# Patient Record
Sex: MALE | Race: WHITE | NOT HISPANIC OR LATINO | URBAN - METROPOLITAN AREA
[De-identification: names, ages, dates, MRNs, and addresses within clinical notes are randomized per-mention and may not be internally consistent; named-entity substitution may affect disease eponyms.]

---

## 2017-03-13 PROBLEM — J84.9 INTERSTITIAL LUNG DISEASE (HCC): Status: ACTIVE | Noted: 2017-03-13

## 2017-03-13 PROBLEM — G47.33 OSA (OBSTRUCTIVE SLEEP APNEA): Status: ACTIVE | Noted: 2017-03-13

## 2017-03-13 PROBLEM — G62.9 PERIPHERAL NEUROPATHY: Status: ACTIVE | Noted: 2017-03-13

## 2017-03-13 PROBLEM — N28.9 RENAL INSUFFICIENCY: Status: ACTIVE | Noted: 2017-03-13

## 2017-03-13 PROBLEM — L30.9 ECZEMA: Status: ACTIVE | Noted: 2017-03-13

## 2017-03-13 PROBLEM — J45.909 ASTHMA: Status: ACTIVE | Noted: 2017-03-13

## 2017-03-13 PROBLEM — N05.9 BRIGHT'S DISEASE: Status: ACTIVE | Noted: 2017-03-13

## 2017-03-13 PROBLEM — G47.34 NOCTURNAL HYPOXEMIA: Status: ACTIVE | Noted: 2017-03-13

## 2017-03-13 PROBLEM — J30.9 ALLERGIC RHINITIS: Status: ACTIVE | Noted: 2017-03-13

## 2017-03-13 PROBLEM — C44.91 BASAL CELL CARCINOMA: Status: ACTIVE | Noted: 2017-03-13

## 2017-03-13 PROBLEM — E78.5 HYPERLIPIDEMIA: Status: ACTIVE | Noted: 2017-03-13

## 2017-03-13 PROBLEM — K21.9 GERD (GASTROESOPHAGEAL REFLUX DISEASE): Status: ACTIVE | Noted: 2017-03-13

## 2017-03-13 PROBLEM — K58.9 IBS (IRRITABLE BOWEL SYNDROME): Status: ACTIVE | Noted: 2017-03-13

## 2017-05-10 RX ORDER — GABAPENTIN 300 MG/1
CAPSULE ORAL
Qty: 180 CAPSULE | Refills: 2 | Status: SHIPPED | OUTPATIENT
Start: 2017-05-10 | End: 2018-11-29 | Stop reason: SDUPTHER

## 2018-08-06 RX ORDER — GABAPENTIN 300 MG/1
CAPSULE ORAL
Qty: 180 CAPSULE | Refills: 0 | OUTPATIENT
Start: 2018-08-06

## 2018-10-23 ENCOUNTER — OFFICE VISIT (OUTPATIENT)
Dept: NEUROLOGY | Facility: CLINIC | Age: 63
End: 2018-10-23

## 2018-10-23 VITALS
WEIGHT: 264.55 LBS | DIASTOLIC BLOOD PRESSURE: 84 MMHG | BODY MASS INDEX: 30.61 KG/M2 | SYSTOLIC BLOOD PRESSURE: 124 MMHG | HEIGHT: 78 IN

## 2018-10-23 DIAGNOSIS — G62.9 PERIPHERAL POLYNEUROPATHY: Primary | ICD-10-CM

## 2018-10-23 DIAGNOSIS — G57.12 MERALGIA PARESTHETICA OF LEFT SIDE: ICD-10-CM

## 2018-10-23 PROCEDURE — 99214 OFFICE O/P EST MOD 30 MIN: CPT | Performed by: PHYSICIAN ASSISTANT

## 2018-10-23 RX ORDER — LORATADINE 10 MG/1
TABLET ORAL DAILY
COMMUNITY
Start: 2016-03-24

## 2018-10-23 RX ORDER — OMEPRAZOLE 20 MG/1
20 CAPSULE, DELAYED RELEASE ORAL DAILY
COMMUNITY
End: 2022-12-24

## 2018-10-23 RX ORDER — BUDESONIDE AND FORMOTEROL FUMARATE DIHYDRATE 80; 4.5 UG/1; UG/1
AEROSOL RESPIRATORY (INHALATION)
COMMUNITY
Start: 2014-04-21

## 2018-10-23 NOTE — PROGRESS NOTES
Subjective     Chief Complaint: numbness      History of Present Illness   Pt originally seen 2/24/15 at the request of Dr. Maude Lim for evaluation of neuropathy that he reports began years ago but is worsening. Figured out dx himself by comparison to mother's sx. Noted 6-7 years ago that he could not feel pin on toe. Also describes painful numbness intermittently lateral thigh maybe triggered by keys rubbing in pocket. That has not occurred last two years, until using GBP 300mg qhs past month, and it has just recently recurred -- extremely painful and keeps him awake at night.     Right more than left foot numbness more than burning pain, and recently felt like hole in bottom of sock where there wasn't. Usually felt more in toes. Has progressed. Strength is good. Does get sharp pain in left shin, in muscle, cramping at times.   Dtr who is 20 (and has Eve Danlos) already has tingling.    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 Recent serum immunofixation, B12, TSH, sedimentation rate and A1c were normal, the latter 5.7.    Mother has neuropathy, and grandmother had similar pain.   Father has flat feet, and pt has had flat feet whole life.  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 Most other common (exacerbating) causes ruled out with labs to date. Discussed potential for genetic testing (declines), and recommended NCS and OGTT, but he is not sure he wants to pursue these. He wants to think this over.   For now, will restart and titrate GBP and discussed potential SEs, alternatives. Discussed weight loss for the meralgia paresthetica.  5/15: GBP helps a lot for the pain. Takes 2 at bedtime only, because the worst pain is with lying in bed at night. GBP also helps sleep. No bad thigh pain since on the GBP. Was worrying about taking more -- very concerned about taking excessive medication and would prefer no medicine at all. Wonders if he could skip the  "gabapentin some nights and then take something for sleep alone. No weakness.     Today: Since his last visit in 5/15, he notes that his symptoms are essentially unchanged. He currently taking GBP 300mg or 600mg nightly, which has been beneficial.         I have reviewed and confirmed the past family, social and medical history as accurate on 10/23/18.     Review of Systems   Constitutional: Negative.    HENT: Negative.    Eyes: Negative.    Respiratory: Negative.    Cardiovascular: Negative.    Gastrointestinal: Negative.    Endocrine: Negative.    Genitourinary: Negative.    Musculoskeletal: Negative.    Skin: Negative.    Allergic/Immunologic: Negative.    Neurological:        Numbness    Hematological: Negative.    Psychiatric/Behavioral: Negative.        Objective     /84   Ht 198.1 cm (78\")   Wt 120 kg (264 lb 8.8 oz)   BMI 30.57 kg/m²     General appearance today is normal.       Physical Exam   Neurological: He has normal strength. He has a normal Finger-Nose-Finger Test. Gait normal.   Psychiatric: His speech is normal.        Neurologic Exam     Mental Status   Speech: speech is normal   Level of consciousness: alert  Normal comprehension.     Cranial Nerves   Cranial nerves II through XII intact.     Motor Exam   Muscle bulk: normal  Overall muscle tone: normal    Strength   Strength 5/5 throughout.     Sensory Exam   Decreased sensation in lower extremities, worse on the right      Gait, Coordination, and Reflexes     Gait  Gait: normal    Coordination   Finger to nose coordination: normal    Tremor   Resting tremor: absent        Assessment/Plan   Khang was seen today for peripheral neuropathy.    Diagnoses and all orders for this visit:    Peripheral polyneuropathy    Meralgia paresthetica of left side          Discussion/Summary   Khang Abebe returns to clinic today with a history concerning for a hereditary peripheral neuropathy as well as Meralgia paresthetica. We discussed potentially " obtaining genetic testing and/or an EMG, thought this was reasonably declined as he feels that he is doing well overall. After discussing potential treatment options, it was elected to continue on GBP unchanged. He will then follow up in 1 year, or sooner if needed.   I spent 25 minutes minutes face to face with the patient and family with 15 minutes spent on discussing diagnosis, prognosis, evaluation, current status, treatment options and management as discussed above.       As part of this visit I discussed the history with the patient .      Shawna Wang PA-C

## 2018-11-20 NOTE — TELEPHONE ENCOUNTER
Khang called requesting a refill on his GBP. States in Shawna Wang's note continue unchanged.     ,  Can you please approve this refill?

## 2018-11-29 RX ORDER — GABAPENTIN 300 MG/1
300 CAPSULE ORAL
Qty: 180 CAPSULE | Refills: 2 | Status: SHIPPED | OUTPATIENT
Start: 2018-11-29 | End: 2019-02-28 | Stop reason: SDUPTHER

## 2019-02-28 RX ORDER — GABAPENTIN 300 MG/1
CAPSULE ORAL
Qty: 180 CAPSULE | Refills: 1 | Status: SHIPPED | OUTPATIENT
Start: 2019-02-28 | End: 2019-04-27 | Stop reason: SDUPTHER

## 2019-04-29 RX ORDER — GABAPENTIN 300 MG/1
CAPSULE ORAL
Qty: 180 CAPSULE | Refills: 0 | Status: SHIPPED | OUTPATIENT
Start: 2019-04-29 | End: 2019-05-24 | Stop reason: SDUPTHER

## 2019-05-24 RX ORDER — GABAPENTIN 300 MG/1
CAPSULE ORAL
Qty: 180 CAPSULE | Refills: 0 | Status: SHIPPED | OUTPATIENT
Start: 2019-05-24 | End: 2019-06-22 | Stop reason: SDUPTHER

## 2019-06-24 RX ORDER — GABAPENTIN 300 MG/1
CAPSULE ORAL
Qty: 180 CAPSULE | Refills: 0 | Status: SHIPPED | OUTPATIENT
Start: 2019-06-24 | End: 2019-07-23 | Stop reason: SDUPTHER

## 2019-06-26 RX ORDER — GABAPENTIN 300 MG/1
CAPSULE ORAL
Qty: 180 CAPSULE | Refills: 0 | OUTPATIENT
Start: 2019-06-26

## 2019-07-24 RX ORDER — GABAPENTIN 300 MG/1
CAPSULE ORAL
Qty: 180 CAPSULE | Refills: 0 | Status: SHIPPED | OUTPATIENT
Start: 2019-07-24 | End: 2019-08-26 | Stop reason: SDUPTHER

## 2019-08-26 RX ORDER — GABAPENTIN 300 MG/1
CAPSULE ORAL
Qty: 180 CAPSULE | Refills: 0 | Status: SHIPPED | OUTPATIENT
Start: 2019-08-26 | End: 2019-09-23 | Stop reason: SDUPTHER

## 2019-09-25 RX ORDER — GABAPENTIN 300 MG/1
CAPSULE ORAL
Qty: 180 CAPSULE | Refills: 0 | Status: SHIPPED | OUTPATIENT
Start: 2019-09-25 | End: 2020-01-30

## 2019-09-25 NOTE — TELEPHONE ENCOUNTER
That is correct. Khang saw Shawna on 10/23/18, but his follow up appt is coming up next month and has been scheduled with  so this will need to be changed! Attempted to contact patient in order to get him rescheduled with [facility] Romo instead. Office # given.    ,  In the meantime would you be willing to approve a 1 month supply and I will add on instructions:   PLEASE CALL TO RESCHEDULE FOLLOW UP APPT.

## 2019-09-27 ENCOUNTER — TELEPHONE (OUTPATIENT)
Dept: NEUROLOGY | Facility: CLINIC | Age: 64
End: 2019-09-27
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
## 2019-09-27 NOTE — TELEPHONE ENCOUNTER
I'm sorry to hear about this. We can refill through October, but not beyond. However, the providers in the hospital can likely provide refills until she can come in for follow-up. Or her PCP may be able to take over. Thanks.

## 2019-09-27 NOTE — TELEPHONE ENCOUNTER
Informed the patient and wife to contact the physicans in the hospital and his PCP. Since pt has not been seen in almost a year explained that we would need a recent appt.

## 2019-10-22 ENCOUNTER — OFFICE VISIT (OUTPATIENT)
Dept: NEUROLOGY | Facility: CLINIC | Age: 64
End: 2019-10-22

## 2019-10-22 VITALS — HEIGHT: 78 IN | WEIGHT: 264 LBS | BODY MASS INDEX: 30.55 KG/M2

## 2019-10-22 DIAGNOSIS — G57.12 MERALGIA PARESTHETICA OF LEFT SIDE: ICD-10-CM

## 2019-10-22 DIAGNOSIS — G62.9 PERIPHERAL POLYNEUROPATHY: Primary | ICD-10-CM

## 2019-10-22 PROCEDURE — 99214 OFFICE O/P EST MOD 30 MIN: CPT | Performed by: PHYSICIAN ASSISTANT

## 2019-10-22 RX ORDER — MULTIPLE VITAMINS W/ MINERALS TAB 9MG-400MCG
1 TAB ORAL DAILY
COMMUNITY

## 2019-10-22 RX ORDER — ERGOCALCIFEROL (VITAMIN D2) 10 MCG
400 TABLET ORAL DAILY
COMMUNITY
End: 2021-10-29

## 2019-10-22 NOTE — PROGRESS NOTES
Subjective     Chief Complaint: numbness      History of Present Illness   Pt originally seen 2/24/15 at the request of Dr. Maude Lim for evaluation of neuropathy that he reports began years ago but is worsening. Figured out dx himself by comparison to mother's sx. Noted 6-7 years ago that he could not feel pin on toe. Also describes painful numbness intermittently lateral thigh maybe triggered by keys rubbing in pocket. That has not occurred last two years, until using GBP 300mg qhs past month, and it has just recently recurred -- extremely painful and keeps him awake at night.      Right more than left foot numbness more than burning pain, and recently felt like hole in bottom of sock where there wasn't. Usually felt more in toes. Has progressed. Strength is good. Does get sharp pain in left shin, in muscle, cramping at times.   Dtr who is 20 (and has Eve Danlos) already has tingling.     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 Recent serum immunofixation, B12, TSH, sedimentation rate and A1c were normal, the latter 5.7.     Mother has neuropathy, and grandmother had similar pain.   Father has flat feet, and pt has had flat feet whole life.  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 Most other common (exacerbating) causes ruled out with labs to date. Discussed potential for genetic testing (declines), and recommended NCS and OGTT, but he is not sure he wants to pursue these. He wants to think this over.   For now, will restart and titrate GBP and discussed potential SEs, alternatives. Discussed weight loss for the meralgia paresthetica.  5/15: GBP helps a lot for the pain. Takes 2 at bedtime only, because the worst pain is with lying in bed at night. GBP also helps sleep. No bad thigh pain since on the GBP. Was worrying about taking more -- very concerned about taking excessive medication and would prefer no medicine at all. Wonders if he could skip the  "gabapentin some nights and then take something for sleep alone. No weakness.     Today: Since his last visit in 10/18, he notes that his symptoms have worsened after he sustained a fall in 9/19. Unfortunately, he required surgery for a left femur fracture. He also fractured his right fibula. He is taking GBP 300mg BID, which has been beneficial.       I have reviewed and confirmed the past family, social and medical history as accurate on 10/22/19.     Review of Systems   Constitutional: Negative.    HENT: Negative.    Eyes: Negative.    Respiratory: Negative.    Cardiovascular: Negative.    Gastrointestinal: Negative.    Endocrine: Negative.    Genitourinary: Negative.    Musculoskeletal: Negative.    Skin: Negative.    Allergic/Immunologic: Negative.    Hematological: Negative.    Psychiatric/Behavioral: Negative.        Objective     Ht 198.1 cm (78\")   Wt 120 kg (264 lb)   BMI 30.51 kg/m²     General appearance today is normal.       Physical Exam   Neurological: He has normal strength. He has a normal Finger-Nose-Finger Test.   Psychiatric: His speech is normal.        Neurologic Exam     Mental Status   Speech: speech is normal   Level of consciousness: alert  Normal comprehension.     Cranial Nerves   Cranial nerves II through XII intact.     Motor Exam   Muscle bulk: normal  Overall muscle tone: normal    Strength   Strength 5/5 throughout.     Sensory Exam   Decreased sensation in lower extremities, worse on the right       Gait, Coordination, and Reflexes     Coordination   Finger to nose coordination: normal    Tremor   Resting tremor: absent            Assessment/Plan   Khang was seen today for peripheral neuropathy.    Diagnoses and all orders for this visit:    Peripheral polyneuropathy    Meralgia paresthetica of left side          Discussion/Summary   Khang Abebe returns to clinic today with a history concerning for a hereditary peripheral neuropathy as well as Meralgia paresthetica. We discussed " potentially obtaining genetic testing and/or an EMG, thought this was reasonably declined as he feels that he is doing well overall. After discussing potential treatment options, it was elected to continue on GBP unchanged. He will then follow up in 1 year, or sooner if needed.   I spent 25 minutes face to face with the patient and family with 15 minutes spent on discussing diagnosis, prognosis, diagnostic testing, evaluation, current status, treatment options and management as discussed above.       As part of this visit I obtained additional history from the family which is incorporated in the HPI.      Shawna Wang PA-C

## 2020-01-30 DIAGNOSIS — G62.9 PERIPHERAL POLYNEUROPATHY: Primary | ICD-10-CM

## 2020-01-30 RX ORDER — GABAPENTIN 300 MG/1
CAPSULE ORAL
Qty: 180 CAPSULE | Refills: 0 | Status: SHIPPED | OUTPATIENT
Start: 2020-01-30 | End: 2020-03-24

## 2020-03-24 DIAGNOSIS — G62.9 PERIPHERAL POLYNEUROPATHY: ICD-10-CM

## 2020-03-24 RX ORDER — GABAPENTIN 300 MG/1
CAPSULE ORAL
Qty: 180 CAPSULE | Refills: 0 | Status: SHIPPED | OUTPATIENT
Start: 2020-03-24 | End: 2020-04-06

## 2020-04-03 DIAGNOSIS — G62.9 PERIPHERAL POLYNEUROPATHY: ICD-10-CM

## 2020-04-06 RX ORDER — GABAPENTIN 300 MG/1
CAPSULE ORAL
Qty: 180 CAPSULE | Refills: 0 | Status: SHIPPED | OUTPATIENT
Start: 2020-04-06 | End: 2020-05-18

## 2020-05-16 DIAGNOSIS — G62.9 PERIPHERAL POLYNEUROPATHY: ICD-10-CM

## 2020-05-18 RX ORDER — GABAPENTIN 300 MG/1
CAPSULE ORAL
Qty: 180 CAPSULE | Refills: 0 | Status: SHIPPED | OUTPATIENT
Start: 2020-05-18 | End: 2020-07-02

## 2020-07-01 DIAGNOSIS — G62.9 PERIPHERAL POLYNEUROPATHY: ICD-10-CM

## 2020-07-02 RX ORDER — GABAPENTIN 300 MG/1
CAPSULE ORAL
Qty: 180 CAPSULE | Refills: 0 | Status: SHIPPED | OUTPATIENT
Start: 2020-07-02 | End: 2020-08-04

## 2020-07-29 ENCOUNTER — TELEPHONE (OUTPATIENT)
Dept: FAMILY MEDICINE CLINIC | Facility: CLINIC | Age: 65
End: 2020-07-29

## 2020-08-04 ENCOUNTER — TELEPHONE (OUTPATIENT)
Dept: FAMILY MEDICINE CLINIC | Facility: CLINIC | Age: 65
End: 2020-08-04

## 2020-08-04 DIAGNOSIS — G62.9 PERIPHERAL POLYNEUROPATHY: ICD-10-CM

## 2020-08-04 RX ORDER — GABAPENTIN 300 MG/1
CAPSULE ORAL
Qty: 180 CAPSULE | Refills: 0 | Status: SHIPPED | OUTPATIENT
Start: 2020-08-04 | End: 2020-09-14

## 2020-08-04 NOTE — TELEPHONE ENCOUNTER
Pt called because he would like to know if Dr. Maude Lim is opposed to a pt taking  Hydroxychloroquine. He wants to know Dr's personal opinion. Pt is no longer a pt but, He stated that the answer will determine wether he will return as a patient. Current pcp doesn't prescribe Hydroxychloroquine. He states that he is being proactive in case he so happens to catch Covid he wants a dr that that will prescribe that medication     Please call pt to advise     [phone]

## 2020-09-14 DIAGNOSIS — G62.9 PERIPHERAL POLYNEUROPATHY: ICD-10-CM

## 2020-09-14 RX ORDER — GABAPENTIN 300 MG/1
CAPSULE ORAL
Qty: 180 CAPSULE | Refills: 0 | Status: SHIPPED | OUTPATIENT
Start: 2020-09-14 | End: 2020-10-19

## 2020-10-16 DIAGNOSIS — G62.9 PERIPHERAL POLYNEUROPATHY: ICD-10-CM

## 2020-10-19 RX ORDER — GABAPENTIN 300 MG/1
CAPSULE ORAL
Qty: 180 CAPSULE | Refills: 0 | Status: SHIPPED | OUTPATIENT
Start: 2020-10-19 | End: 2020-11-16

## 2020-10-22 ENCOUNTER — OFFICE VISIT (OUTPATIENT)
Dept: NEUROLOGY | Facility: CLINIC | Age: 65
End: 2020-10-22

## 2020-10-22 ENCOUNTER — LAB (OUTPATIENT)
Dept: LAB | Facility: HOSPITAL | Age: 65
End: 2020-10-22

## 2020-10-22 DIAGNOSIS — G62.9 PERIPHERAL POLYNEUROPATHY: Primary | ICD-10-CM

## 2020-10-22 PROCEDURE — 84207 ASSAY OF VITAMIN B-6: CPT | Performed by: PHYSICIAN ASSISTANT

## 2020-10-22 PROCEDURE — 99214 OFFICE O/P EST MOD 30 MIN: CPT | Performed by: PHYSICIAN ASSISTANT

## 2020-10-22 PROCEDURE — 36415 COLL VENOUS BLD VENIPUNCTURE: CPT | Performed by: PHYSICIAN ASSISTANT

## 2020-10-22 NOTE — PROGRESS NOTES
Subjective       Chief Complaint: neuropathy      History of Present Illness   Pt originally seen 2/24/15 at the request of Dr. Maude Lim for evaluation of neuropathy that he reports began years ago but is worsening. Figured out dx himself by comparison to mother's sx. Noted 6-7 years ago that he could not feel pin on toe. Also describes painful numbness intermittently lateral thigh maybe triggered by keys rubbing in pocket. That has not occurred last two years, until using GBP 300mg qhs past month, and it has just recently recurred -- extremely painful and keeps him awake at night.      Right more than left foot numbness more than burning pain, and recently felt like hole in bottom of sock where there wasn't. Usually felt more in toes. Has progressed. Strength is good. Does get sharp pain in left shin, in muscle, cramping at times.   Dtr who is 20 (and has Eve Danlos) already has tingling.     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 Recent serum immunofixation, B12, TSH, sedimentation rate and A1c were normal, the latter 5.7.     Mother has neuropathy, and grandmother had similar pain.   Father has flat feet, and pt has had flat feet whole life.  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 Most other common (exacerbating) causes ruled out with labs to date. Discussed potential for genetic testing (declines), and recommended NCS and OGTT, but he is not sure he wants to pursue these. He wants to think this over.     Today: Since his last visit in 10/19, he notes that his symptoms have worsened after he sustained a fall in 9/19, requiring surgery for a left femur fracture. This has gradually improved over the last year. He is currently taking GBP 1200mg daily, in divided doses.       I have reviewed and confirmed the past family, social and medical history as accurate on 10/22/2020.     Review of Systems   Constitutional: Negative.    HENT: Negative.    Eyes:  Negative.    Respiratory: Negative.    Cardiovascular: Negative.    Gastrointestinal: Negative.    Endocrine: Negative.    Genitourinary: Negative.    Musculoskeletal: Negative.    Skin: Negative.    Allergic/Immunologic: Negative.    Neurological: Positive for numbness.   Hematological: Negative.    Psychiatric/Behavioral: Negative.        Objective     General appearance today is normal.       Physical Exam  Neurological:      Coordination: Finger-Nose-Finger Test and Heel to Shin Test normal.      Gait: Gait is intact.      Deep Tendon Reflexes: Strength normal.   Psychiatric:         Speech: Speech normal.          Neurologic Exam     Mental Status   Speech: speech is normal   Level of consciousness: alert  Normal comprehension.     Cranial Nerves   Cranial nerves II through XII intact.     Motor Exam   Muscle bulk: normal  Overall muscle tone: normal    Strength   Strength 5/5 throughout.     Gait, Coordination, and Reflexes     Gait  Gait: normal    Coordination   Finger to nose coordination: normal  Heel to shin coordination: normal    Tremor   Resting tremor: absent        Assessment/Plan   Diagnoses and all orders for this visit:    1. Peripheral polyneuropathy (Primary)  -     Vitamin B6          Discussion/Summary   Khang Abebe returns to clinic today with a history of peripheral neuropathy. I again reviewed his current status and treatment options. It was elected to obtain screening blood work . After discussing potential treatment options, it was elected to continue on GBP unchanged for now as this has been beneficial. He will then follow up in 6 months , or sooner if needed.   I spent 25 minutes face to face with the patient with 15 minutes spent on discussing diagnosis, diagnostic testing, evaluation, current status, treatment options and management as discussed above.       As part of this visit I discussed the history with the patient .      Shawna Wang PA-C

## 2020-10-30 ENCOUNTER — TELEPHONE (OUTPATIENT)
Dept: NEUROLOGY | Facility: CLINIC | Age: 65
End: 2020-10-30

## 2020-11-04 LAB — VIT B6 SERPL-MCNC: 7.7 UG/L (ref 5.3–46.7)

## 2020-11-16 DIAGNOSIS — G62.9 PERIPHERAL POLYNEUROPATHY: ICD-10-CM

## 2020-11-16 RX ORDER — GABAPENTIN 300 MG/1
CAPSULE ORAL
Qty: 180 CAPSULE | Refills: 0 | Status: SHIPPED | OUTPATIENT
Start: 2020-11-16 | End: 2020-12-31

## 2020-12-30 DIAGNOSIS — G62.9 PERIPHERAL POLYNEUROPATHY: ICD-10-CM

## 2020-12-31 RX ORDER — GABAPENTIN 300 MG/1
CAPSULE ORAL
Qty: 180 CAPSULE | Refills: 4 | Status: SHIPPED | OUTPATIENT
Start: 2020-12-31 | End: 2021-06-07

## 2021-04-29 ENCOUNTER — OFFICE VISIT (OUTPATIENT)
Dept: NEUROLOGY | Facility: CLINIC | Age: 66
End: 2021-04-29

## 2021-04-29 VITALS
WEIGHT: 281 LBS | OXYGEN SATURATION: 98 % | TEMPERATURE: 96.9 F | DIASTOLIC BLOOD PRESSURE: 82 MMHG | BODY MASS INDEX: 32.51 KG/M2 | HEART RATE: 52 BPM | HEIGHT: 78 IN | SYSTOLIC BLOOD PRESSURE: 126 MMHG

## 2021-04-29 DIAGNOSIS — G62.9 PERIPHERAL POLYNEUROPATHY: Primary | ICD-10-CM

## 2021-04-29 PROCEDURE — 99214 OFFICE O/P EST MOD 30 MIN: CPT | Performed by: NURSE PRACTITIONER

## 2021-04-29 RX ORDER — TIZANIDINE 4 MG/1
4 TABLET ORAL NIGHTLY PRN
COMMUNITY
End: 2021-10-29

## 2021-04-29 NOTE — PROGRESS NOTES
Subjective   Patient ID: Khang Abebe is a 65 y.o. male     Chief Complaint   Patient presents with   • polyneuropathy        History of Present Illness  65 y.o. Male presents in follow up for neuropathy. At last appointment on 10/22/20 continued  mg 6 times daily.     Trelstar was started in January for his prostate cancer and developed generalized body pain, fatigue, dizziness, diarrhea, and flushing. Feels his pain has worsened from the medication. Has read that he has the majority of his symptoms are related to the Trelstar.    Has been taking 600 mg PO QHS of GBP most of the time. But recently feels his pain is worse at night.     Problem History:    10 year hx of painful numbness and worsening over time.     R > L foot numbness more than burning pain, feels like there is a hole in the bottom of the sock but there isn't one. Sharp pain in L shin, cramping. Daugther has Eve Danlos. HX of bright disease. Labs NCS.     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 Most other common (exacerbating) causes ruled out with labs to date. Discussed potential for genetic testing (declines), and recommended NCS and OGTT, but he is not sure he wants to pursue these. He wants to think this over.      Today: Since his last visit in 10/19, he notes that his symptoms have worsened after he sustained a fall in 9/19, requiring surgery for a left femur fracture. This has gradually improved over the last year. He is currently taking GBP 1200mg daily, in divided doses.     Past Medical History:   Diagnosis Date   • Actinic keratosis    • Asthma    • Bronchitis    • CAP (community acquired pneumonia)    • Cardiac abnormality    • Fatigue    • H/O chest x-ray 02/17/2016    Interstitial and granulomatous scarring is seen centrally and peripherallly in the chest. Superimposed active disease or consolidation is not identified   • H/O chest x-ray 07/12/2014    No acute cardiopulmonary process   • H/O  "echocardiogram 10/31/2014    Normal LVSF. Est EF 60-65%. Mild mitral valve prolpase. Trace MR   • History of PFTs 03/24/2016    PFT acceptable and reproducible. Pt gave best effort. Normal PFT. Normal lung volumes and normal diffusion   • Low testosterone    • Meralgia paresthetica    • Neuropathy    • Sinusitis    • Sleep apnea    • Strep pharyngitis      Family History   Problem Relation Age of Onset   • Alcohol abuse Father    • Liver cancer Father    • Lung cancer Father    • Heart disease Other         coronary arteriosclerosis   • Other Other         PVD     Social History     Socioeconomic History   • Marital status:      Spouse name: Not on file   • Number of children: Not on file   • Years of education: Not on file   • Highest education level: Not on file   Tobacco Use   • Smoking status: Never Smoker   Substance and Sexual Activity   • Alcohol use: No   • Drug use: Defer   • Sexual activity: Defer       Review of Systems    Objective     Vitals:    04/29/21 1053   BP: 126/82   Pulse: 52   Temp: 96.9 °F (36.1 °C)   SpO2: 98%   Weight: 127 kg (281 lb)   Height: 198.1 cm (78\")       Neurologic Exam     Mental Status   Oriented to person, place, and time.   Follows 3 step commands.   Attention: normal. Concentration: normal.   Speech: speech is normal   Level of consciousness: alert  Knowledge: good and consistent with education.   Able to name object. Able to read. Able to repeat. Able to write. Normal comprehension.     Cranial Nerves     CN II   Visual fields full to confrontation.   Visual acuity: normal  Right visual field deficit: none  Left visual field deficit: none     CN III, IV, VI   Pupils are equal, round, and reactive to light.  Extraocular motions are normal.   Right pupil: Shape: regular. Reactivity: brisk. Consensual response: intact.   Left pupil: Shape: regular. Reactivity: brisk. Consensual response: intact.   Nystagmus: none   Diplopia: none  Ophthalmoparesis: none  Upgaze: " normal  Downgaze: normal  Conjugate gaze: present  Vestibulo-ocular reflex: present    CN V   Facial sensation intact.   Right corneal reflex: normal  Left corneal reflex: normal    CN VII   Right facial weakness: none  Left facial weakness: none    CN VIII   Hearing: intact    CN IX, X   Palate: symmetric  Right gag reflex: normal  Left gag reflex: normal    CN XI   Right sternocleidomastoid strength: normal  Left sternocleidomastoid strength: normal    CN XII   Tongue: not atrophic  Fasciculations: absent  Tongue deviation: none    Motor Exam   Muscle bulk: normal  Overall muscle tone: normal  Right arm tone: normal  Left arm tone: normal  Right leg tone: normal  Left leg tone: normal    Strength   Strength 5/5 throughout.     Sensory Exam   Right leg light touch: decreased from knee  Left leg light touch: decreased from knee  Proprioception normal.     Gait, Coordination, and Reflexes     Gait  Gait: normal    Coordination   Finger to nose coordination: normal  Tandem walking coordination: normal    Tremor   Resting tremor: absent  Intention tremor: absent  Action tremor: absent    Reflexes   Reflexes 2+ except as noted.       Physical Exam  Vitals and nursing note reviewed.   Constitutional:       Appearance: Normal appearance.   HENT:      Head: Normocephalic.   Eyes:      Extraocular Movements: EOM normal.      Pupils: Pupils are equal, round, and reactive to light.   Skin:     General: Skin is warm and dry.   Neurological:      Mental Status: He is alert and oriented to person, place, and time.      Coordination: Finger-Nose-Finger Test normal.      Gait: Gait is intact. Tandem walk normal.      Deep Tendon Reflexes: Strength normal.   Psychiatric:         Mood and Affect: Mood normal.         Speech: Speech normal.         Office Visit on 10/22/2020   Component Date Value Ref Range Status   • Vitamin B6 10/22/2020 7.7  5.3 - 46.7 ug/L Final         Assessment/Plan     Problem List Items Addressed This Visit         Neuro    Peripheral neuropathy - Primary    Current Assessment & Plan     Patient has taken higher doses of GBP in the past, advised to increase dosage to 900 mg PO QPM and 300 mg PO QAM.     Discussed a referral to pain management as patient was interested in injections. He declines referral at this time.     F/U in 6 months or sooner if needed          Relevant Medications    gabapentin (NEURONTIN) 300 MG capsule             Return in about 6 months (around 10/29/2021).

## 2021-04-29 NOTE — ASSESSMENT & PLAN NOTE
Patient has taken higher doses of GBP in the past, advised to increase dosage to 900 mg PO QPM and 300 mg PO QAM.     Discussed a referral to pain management as patient was interested in injections. He declines referral at this time.     F/U in 6 months or sooner if needed

## 2021-06-04 DIAGNOSIS — G62.9 PERIPHERAL POLYNEUROPATHY: ICD-10-CM

## 2021-06-07 RX ORDER — GABAPENTIN 300 MG/1
CAPSULE ORAL
Qty: 180 CAPSULE | Refills: 3 | Status: SHIPPED | OUTPATIENT
Start: 2021-06-07 | End: 2021-10-29 | Stop reason: SDUPTHER

## 2021-10-29 ENCOUNTER — OFFICE VISIT (OUTPATIENT)
Dept: NEUROLOGY | Facility: CLINIC | Age: 66
End: 2021-10-29

## 2021-10-29 DIAGNOSIS — G62.9 PERIPHERAL POLYNEUROPATHY: Primary | ICD-10-CM

## 2021-10-29 PROCEDURE — 99215 OFFICE O/P EST HI 40 MIN: CPT | Performed by: PHYSICIAN ASSISTANT

## 2021-10-29 RX ORDER — TAMSULOSIN HYDROCHLORIDE 0.4 MG/1
CAPSULE ORAL
COMMUNITY
Start: 2021-10-26

## 2021-10-29 RX ORDER — DULOXETIN HYDROCHLORIDE 20 MG/1
20 CAPSULE, DELAYED RELEASE ORAL 2 TIMES DAILY
Qty: 60 CAPSULE | Refills: 11 | OUTPATIENT
Start: 2021-10-29 | End: 2022-11-08

## 2021-10-29 RX ORDER — PNV NO.95/FERROUS FUM/FOLIC AC 28MG-0.8MG
TABLET ORAL
COMMUNITY
Start: 2021-08-10

## 2021-10-29 RX ORDER — DICLOFENAC SODIUM 30 MG/G
GEL TOPICAL
COMMUNITY
Start: 2021-10-13

## 2021-10-29 RX ORDER — HYDROXYZINE HYDROCHLORIDE 25 MG/1
TABLET, FILM COATED ORAL
COMMUNITY
Start: 2021-09-04

## 2021-10-29 RX ORDER — LIDOCAINE AND PRILOCAINE 25; 25 MG/G; MG/G
CREAM TOPICAL
COMMUNITY
Start: 2021-10-12 | End: 2022-12-24

## 2021-10-29 RX ORDER — GABAPENTIN 300 MG/1
300 CAPSULE ORAL
Qty: 180 CAPSULE | Refills: 5 | Status: SHIPPED | OUTPATIENT
Start: 2021-10-29 | End: 2022-05-05

## 2021-10-29 RX ORDER — ERGOCALCIFEROL 1.25 MG/1
CAPSULE ORAL
COMMUNITY
Start: 2021-10-26

## 2021-10-29 RX ORDER — RIZATRIPTAN BENZOATE 10 MG/1
10 TABLET ORAL ONCE AS NEEDED
COMMUNITY
Start: 2021-08-09

## 2021-10-29 RX ORDER — TRAMADOL HYDROCHLORIDE 50 MG/1
TABLET ORAL
COMMUNITY
Start: 2021-10-26 | End: 2022-11-08

## 2021-10-29 NOTE — PROGRESS NOTES
Subjective     Chief Complaint: neuropathy      History of Present Illness   Pt originally seen 2/24/15 at the request of Dr. Maude Lim for evaluation of neuropathy that he reports began years ago but is worsening. Figured out dx himself by comparison to mother's sx. Noted several years ago that he could not feel pin on toe. Also describes painful numbness intermittently lateral thigh maybe triggered by keys rubbing in pocket. That has not occurred last two years, until using GBP 300mg qhs past month, and it has just recently recurred -- extremely painful and keeps him awake at night.      Right more than left foot numbness more than burning pain, and recently felt like hole in bottom of sock where there wasn't. Usually felt more in toes. Has progressed. Strength is good. Does get sharp pain in left shin, in muscle, cramping at times.      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 Recent serum immunofixation, B12, TSH, sedimentation rate and A1c were normal, the latter 5.7.     Mother has neuropathy, and grandmother had similar pain.   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 Most other common (exacerbating) causes ruled out with labs to date. Discussed potential for genetic testing (declines), and recommended NCS and OGTT, but he is not sure he wants to pursue these. He wants to think this over.     Today: Since his last visit in 10/20, he notes increased pain. GBP an tramadol are beneficial.       I have reviewed and confirmed the past family, social and medical history as accurate on 10/29/21.     Review of Systems   Constitutional: Negative.    HENT: Negative.    Eyes: Negative.    Respiratory: Negative.    Cardiovascular: Negative.    Gastrointestinal: Negative.    Endocrine: Negative.    Genitourinary: Negative.    Musculoskeletal: Negative.    Skin: Negative.    Allergic/Immunologic: Negative.    Hematological: Negative.        Objective     General  appearance today is normal.       Physical Exam  Neurological:      Gait: Gait is intact.   Psychiatric:         Speech: Speech normal.          Neurologic Exam     Mental Status   Speech: speech is normal   Level of consciousness: alert  Normal comprehension.     Cranial Nerves   Cranial nerves II through XII intact.     Motor Exam   Muscle bulk: normal    Gait, Coordination, and Reflexes     Gait  Gait: normal    Tremor   Resting tremor: absent          Assessment/Plan   Diagnoses and all orders for this visit:    1. Peripheral polyneuropathy (Primary)  -     gabapentin (NEURONTIN) 300 MG capsule; Take 1 capsule by mouth 6 (Six) Times a Day.  Dispense: 180 capsule; Refill: 5    Other orders  -     DULoxetine (Cymbalta) 20 MG capsule; Take 1 capsule by mouth 2 (Two) Times a Day.  Dispense: 60 capsule; Refill: 11          Discussion/Summary   Khang Abebe returns to clinic today with a history of peripheral neuropathy. I again reviewed his current status and treatment options. After discussing potential treatment options, it was elected to add Cymbalta and continue on GBP unchanged.  He will then follow up in 6 months , or sooner if needed.   Total time of visit today: 40 minutes. As part of this visit I discussed the history with the patient . I also discussed diagnosis, evaluation, current status, treatment options and management as discussed above.             Shawna Wang PA-C

## 2022-04-22 ENCOUNTER — TELEPHONE (OUTPATIENT)
Dept: NEUROLOGY | Facility: CLINIC | Age: 67
End: 2022-04-22

## 2022-05-05 DIAGNOSIS — G62.9 PERIPHERAL POLYNEUROPATHY: ICD-10-CM

## 2022-05-05 RX ORDER — GABAPENTIN 300 MG/1
CAPSULE ORAL
Qty: 180 CAPSULE | Refills: 5 | Status: SHIPPED | OUTPATIENT
Start: 2022-05-05 | End: 2022-05-10 | Stop reason: SDUPTHER

## 2022-05-05 NOTE — TELEPHONE ENCOUNTER
Rx Refill Note  Requested Prescriptions     Pending Prescriptions Disp Refills   • gabapentin (NEURONTIN) 300 MG capsule [Pharmacy Med Name: GABAPENTIN 300 MG CAPSULE] 180 capsule      Sig: TAKE ONE CAPSULE BY MOUTH SIX TIMES A DAY      Last office visit with prescribing clinician: 10/29/2021      Next office visit with prescribing clinician: 5/10/2022            Brett Diaz MA  05/05/22, 08:22 EDT

## 2022-05-10 ENCOUNTER — OFFICE VISIT (OUTPATIENT)
Dept: NEUROLOGY | Facility: CLINIC | Age: 67
End: 2022-05-10

## 2022-05-10 VITALS
DIASTOLIC BLOOD PRESSURE: 78 MMHG | OXYGEN SATURATION: 96 % | SYSTOLIC BLOOD PRESSURE: 136 MMHG | RESPIRATION RATE: 18 BRPM | WEIGHT: 291.8 LBS | BODY MASS INDEX: 33.72 KG/M2 | TEMPERATURE: 98 F | HEART RATE: 64 BPM

## 2022-05-10 DIAGNOSIS — G62.9 PERIPHERAL POLYNEUROPATHY: Primary | ICD-10-CM

## 2022-05-10 PROCEDURE — 99213 OFFICE O/P EST LOW 20 MIN: CPT | Performed by: PHYSICIAN ASSISTANT

## 2022-05-10 RX ORDER — GABAPENTIN 300 MG/1
300 CAPSULE ORAL
Qty: 180 CAPSULE | Refills: 5 | Status: SHIPPED | OUTPATIENT
Start: 2022-05-10 | End: 2022-11-10 | Stop reason: SDUPTHER

## 2022-05-10 RX ORDER — KETOCONAZOLE 20 MG/ML
SHAMPOO TOPICAL
COMMUNITY
Start: 2022-03-29

## 2022-05-10 NOTE — PROGRESS NOTES
Subjective       Chief Complaint: neuropathy     History of Present Illness   Pt originally seen 2/24/15 at the request of Dr. Maude Lim for evaluation of neuropathy that he reports began years ago but is worsening. Figured out dx himself by comparison to mother's sx. Noted several years ago that he could not feel pin on toe. Also describes painful numbness intermittently lateral thigh maybe triggered by keys rubbing in pocket. That has not occurred last two years, until using GBP 300mg qhs past month, and it has just recently recurred -- extremely painful and keeps him awake at night.      Right more than left foot numbness more than burning pain, and recently felt like hole in bottom of sock where there wasn't. Usually felt more in toes. Has progressed. Strength is good. Does get sharp pain in left shin, in muscle, cramping at times.      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 Recent serum immunofixation, B12, TSH, sedimentation rate and A1c were normal, the latter 5.7.     Mother has neuropathy, and grandmother had similar pain.   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 Most other common (exacerbating) causes ruled out with labs to date. Discussed potential for genetic testing (declines), and recommended NCS and OGTT, but he is not sure he wants to pursue these. He wants to think this over.      Today: Since his last visit in 10/21, he notes that his symptoms are manageable with GBP and tramadol. He did not try Cymbalta.       I have reviewed and confirmed the past family, social and medical history as accurate on 5/10/22.     Review of Systems   Constitutional: Negative.    HENT: Negative.    Eyes: Negative.    Respiratory: Negative.    Cardiovascular: Negative.    Gastrointestinal: Negative.    Endocrine: Negative.    Genitourinary: Negative.    Musculoskeletal: Negative.    Skin: Negative.    Allergic/Immunologic: Negative.    Hematological: Negative.         Objective     /78 (BP Location: Left arm, Patient Position: Sitting, Cuff Size: Adult)   Pulse 64   Temp 98 °F (36.7 °C) (Infrared)   Resp 18   Wt 132 kg (291 lb 12.8 oz)   SpO2 96%   BMI 33.72 kg/m²   \  Physical Exam  Neurological:      Coordination: Finger-Nose-Finger Test normal.      Gait: Gait is intact.   Psychiatric:         Speech: Speech normal.          Neurologic Exam     Mental Status   Speech: speech is normal   Level of consciousness: alert  Normal comprehension.     Cranial Nerves   Cranial nerves II through XII intact.     Motor Exam   Muscle bulk: normal    Gait, Coordination, and Reflexes     Gait  Gait: normal    Coordination   Finger to nose coordination: normal    Tremor   Resting tremor: absent        Assessment/Plan    Diagnoses and all orders for this visit:    1. Peripheral polyneuropathy (Primary)          Discussion/Summary   Khang Abebe returns to clinic today for evaluation of peripheral neuropathy. I again reviewed his current status and treatment options.After discussing potential treatment options, it was elected to continue on GBP unchanged.He will then follow up in 6 months , or sooner if needed.   Total time of visit today: 20 minutes. As part of this visit I discussed the history with the patient . I also discussed diagnosis, evaluation, current status, treatment options and management as discussed above.           Shawna Wang PA-C

## 2022-11-10 ENCOUNTER — OFFICE VISIT (OUTPATIENT)
Dept: NEUROLOGY | Facility: CLINIC | Age: 67
End: 2022-11-10

## 2022-11-10 VITALS
BODY MASS INDEX: 33.67 KG/M2 | OXYGEN SATURATION: 97 % | WEIGHT: 291.01 LBS | SYSTOLIC BLOOD PRESSURE: 150 MMHG | DIASTOLIC BLOOD PRESSURE: 90 MMHG | HEIGHT: 78 IN | HEART RATE: 76 BPM

## 2022-11-10 DIAGNOSIS — G62.9 PERIPHERAL POLYNEUROPATHY: Primary | ICD-10-CM

## 2022-11-10 PROCEDURE — 99214 OFFICE O/P EST MOD 30 MIN: CPT | Performed by: PHYSICIAN ASSISTANT

## 2022-11-10 RX ORDER — GABAPENTIN 300 MG/1
300 CAPSULE ORAL
Qty: 180 CAPSULE | Refills: 5 | Status: SHIPPED | OUTPATIENT
Start: 2022-11-10

## 2022-11-10 NOTE — PROGRESS NOTES
Subjective       Chief Complaint: neuropathy     History of Present Illness   Pt originally seen 2/24/15 at the request of Dr. Maude Lim for evaluation of neuropathy that he reports began years ago but is worsening. Figured out dx himself by comparison to mother's sx. Noted several years ago that he could not feel pin on toe. Also describes painful numbness intermittently lateral thigh maybe triggered by keys rubbing in pocket. That has not occurred last two years, until using GBP 300mg qhs past month, and it has just recently recurred -- extremely painful and keeps him awake at night.      Right more than left foot numbness more than burning pain, and recently felt like hole in bottom of sock where there wasn't. Usually felt more in toes. Has progressed. Strength is good. Does get sharp pain in left shin, in muscle, cramping at times.      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 Recent serum immunofixation, B12, TSH, sedimentation rate and A1c were normal, the latter 5.7.     Mother has neuropathy, and grandmother had similar pain.   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 Most other common (exacerbating) causes ruled out with labs to date. Discussed potential for genetic testing (declines), and recommended NCS and OGTT, but he is not sure he wants to pursue these. He wants to think this over.     Today: Since his last visit in 5/22, he notes that his symptoms continue to be manageable with GBP. He did not try Cymbalta.         I have reviewed and confirmed the past family, social and medical history as accurate on 11/10/22.     Review of Systems   Constitutional: Negative.    HENT: Negative.    Eyes: Negative.    Respiratory: Negative.    Cardiovascular: Negative.    Gastrointestinal: Negative.    Endocrine: Negative.    Genitourinary: Negative.    Musculoskeletal: Negative.    Skin: Negative.    Allergic/Immunologic: Negative.    Hematological: Negative.   "      Objective     /90 Comment: 150/90  Pulse 76   Ht 198.1 cm (77.99\")   Wt 132 kg (291 lb 0.1 oz)   SpO2 97%   BMI 33.64 kg/m²     General appearance today is normal.       Physical Exam  Neurological:      Cranial Nerves: Cranial nerves 2-12 are intact.   Psychiatric:         Speech: Speech normal.          Neurologic Exam     Mental Status   Speech: speech is normal   Level of consciousness: alert  Normal comprehension.     Cranial Nerves   Cranial nerves II through XII intact.     Motor Exam   Muscle bulk: normal    Gait, Coordination, and Reflexes     Tremor   Resting tremor: absent        Assessment & Plan   Diagnoses and all orders for this visit:    1. Peripheral polyneuropathy (Primary)          Discussion/Summary   Khang Abebe returns to clinic today for evaluation of peripheral neuropathy. I again reviewed his current status and treatment options. After discussing potential treatment options, it was elected to continue on GBP unchanged.He will then follow up in 6 months, or sooner if needed.   Total time of visit today: 30 minutes. As part of this visit I discussed the history with the patient . I also discussed diagnosis, evaluation, current status, treatment options and management as discussed above.       Shawna Wang PA-C  "

## 2022-11-16 ENCOUNTER — TELEPHONE (OUTPATIENT)
Dept: NEUROLOGY | Facility: CLINIC | Age: 67
End: 2022-11-16

## 2023-05-14 ENCOUNTER — HOSPITAL ENCOUNTER (OUTPATIENT)
Facility: HOSPITAL | Age: 68
Setting detail: OBSERVATION
Discharge: HOME | End: 2023-05-15
Attending: EMERGENCY MEDICINE | Admitting: INTERNAL MEDICINE
Payer: MEDICARE

## 2023-05-14 ENCOUNTER — APPOINTMENT (OUTPATIENT)
Dept: MRI IMAGING | Facility: HOSPITAL | Age: 68
End: 2023-05-14
Payer: MEDICARE

## 2023-05-14 ENCOUNTER — APPOINTMENT (OUTPATIENT)
Dept: CT IMAGING | Facility: HOSPITAL | Age: 68
End: 2023-05-14
Payer: MEDICARE

## 2023-05-14 DIAGNOSIS — M54.9 ACUTE BILATERAL BACK PAIN, UNSPECIFIED BACK LOCATION: ICD-10-CM

## 2023-05-14 DIAGNOSIS — M54.9 INTRACTABLE BACK PAIN: Primary | ICD-10-CM

## 2023-05-14 DIAGNOSIS — M54.16 LUMBAR RADICULOPATHY: ICD-10-CM

## 2023-05-14 DIAGNOSIS — Z85.46 HISTORY OF PROSTATE CANCER: ICD-10-CM

## 2023-05-14 PROBLEM — Z98.84 HISTORY OF ROUX-EN-Y GASTRIC BYPASS: Chronic | Status: ACTIVE | Noted: 2023-05-14

## 2023-05-14 PROBLEM — C61 PROSTATE CANCER: Chronic | Status: ACTIVE | Noted: 2023-05-14

## 2023-05-14 LAB
ALBUMIN SERPL-MCNC: 4.1 G/DL (ref 3.5–5.2)
ALBUMIN/GLOB SERPL: 1.6 G/DL
ALP SERPL-CCNC: 71 U/L (ref 39–117)
ALT SERPL W P-5'-P-CCNC: 18 U/L (ref 1–41)
ANION GAP SERPL CALCULATED.3IONS-SCNC: 8 MMOL/L (ref 5–15)
AST SERPL-CCNC: 24 U/L (ref 1–40)
BASOPHILS # BLD AUTO: 0.04 10*3/MM3 (ref 0–0.2)
BASOPHILS NFR BLD AUTO: 0.8 % (ref 0–1.5)
BILIRUB SERPL-MCNC: 0.4 MG/DL (ref 0–1.2)
BILIRUB UR QL STRIP: NEGATIVE
BUN SERPL-MCNC: 13 MG/DL (ref 8–23)
BUN/CREAT SERPL: 17.1 (ref 7–25)
CALCIUM SPEC-SCNC: 10 MG/DL (ref 8.6–10.5)
CHLORIDE SERPL-SCNC: 108 MMOL/L (ref 98–107)
CK SERPL-CCNC: 87 U/L (ref 20–200)
CLARITY UR: CLEAR
CO2 SERPL-SCNC: 22 MMOL/L (ref 22–29)
COLOR UR: YELLOW
CREAT SERPL-MCNC: 0.76 MG/DL (ref 0.76–1.27)
D-LACTATE SERPL-SCNC: 0.6 MMOL/L (ref 0.5–2)
DEPRECATED RDW RBC AUTO: 38.8 FL (ref 37–54)
EGFRCR SERPLBLD CKD-EPI 2021: 99.7 ML/MIN/1.73
EOSINOPHIL # BLD AUTO: 0.36 10*3/MM3 (ref 0–0.4)
EOSINOPHIL NFR BLD AUTO: 6.9 % (ref 0.3–6.2)
ERYTHROCYTE [DISTWIDTH] IN BLOOD BY AUTOMATED COUNT: 12.5 % (ref 12.3–15.4)
GLOBULIN UR ELPH-MCNC: 2.6 GM/DL
GLUCOSE SERPL-MCNC: 115 MG/DL (ref 65–99)
GLUCOSE UR STRIP-MCNC: NEGATIVE MG/DL
HCT VFR BLD AUTO: 44.1 % (ref 37.5–51)
HGB BLD-MCNC: 14.7 G/DL (ref 13–17.7)
HGB UR QL STRIP.AUTO: NEGATIVE
HOLD SPECIMEN: NORMAL
IMM GRANULOCYTES # BLD AUTO: 0.02 10*3/MM3 (ref 0–0.05)
IMM GRANULOCYTES NFR BLD AUTO: 0.4 % (ref 0–0.5)
KETONES UR QL STRIP: NEGATIVE
LEUKOCYTE ESTERASE UR QL STRIP.AUTO: NEGATIVE
LIPASE SERPL-CCNC: 21 U/L (ref 13–60)
LYMPHOCYTES # BLD AUTO: 0.81 10*3/MM3 (ref 0.7–3.1)
LYMPHOCYTES NFR BLD AUTO: 15.5 % (ref 19.6–45.3)
MCH RBC QN AUTO: 28.4 PG (ref 26.6–33)
MCHC RBC AUTO-ENTMCNC: 33.3 G/DL (ref 31.5–35.7)
MCV RBC AUTO: 85.1 FL (ref 79–97)
MONOCYTES # BLD AUTO: 0.31 10*3/MM3 (ref 0.1–0.9)
MONOCYTES NFR BLD AUTO: 5.9 % (ref 5–12)
NEUTROPHILS NFR BLD AUTO: 3.68 10*3/MM3 (ref 1.7–7)
NEUTROPHILS NFR BLD AUTO: 70.5 % (ref 42.7–76)
NITRITE UR QL STRIP: NEGATIVE
NRBC BLD AUTO-RTO: 0 /100 WBC (ref 0–0.2)
PH UR STRIP.AUTO: 6 [PH] (ref 5–8)
PLATELET # BLD AUTO: 235 10*3/MM3 (ref 140–450)
PMV BLD AUTO: 9.8 FL (ref 6–12)
POTASSIUM SERPL-SCNC: 4.3 MMOL/L (ref 3.5–5.2)
PROT SERPL-MCNC: 6.7 G/DL (ref 6–8.5)
PROT UR QL STRIP: NEGATIVE
RBC # BLD AUTO: 5.18 10*6/MM3 (ref 4.14–5.8)
SODIUM SERPL-SCNC: 138 MMOL/L (ref 136–145)
SP GR UR STRIP: 1.01 (ref 1–1.03)
TROPONIN T SERPL HS-MCNC: 6 NG/L
UROBILINOGEN UR QL STRIP: NORMAL
WBC NRBC COR # BLD: 5.22 10*3/MM3 (ref 3.4–10.8)
WHOLE BLOOD HOLD COAG: NORMAL
WHOLE BLOOD HOLD SPECIMEN: NORMAL

## 2023-05-14 PROCEDURE — 83605 ASSAY OF LACTIC ACID: CPT | Performed by: EMERGENCY MEDICINE

## 2023-05-14 PROCEDURE — 81003 URINALYSIS AUTO W/O SCOPE: CPT | Performed by: EMERGENCY MEDICINE

## 2023-05-14 PROCEDURE — 94640 AIRWAY INHALATION TREATMENT: CPT

## 2023-05-14 PROCEDURE — 85025 COMPLETE CBC W/AUTO DIFF WBC: CPT | Performed by: EMERGENCY MEDICINE

## 2023-05-14 PROCEDURE — 25010000002 KETOROLAC TROMETHAMINE PER 15 MG: Performed by: FAMILY MEDICINE

## 2023-05-14 PROCEDURE — 96376 TX/PRO/DX INJ SAME DRUG ADON: CPT

## 2023-05-14 PROCEDURE — A9577 INJ MULTIHANCE: HCPCS | Performed by: EMERGENCY MEDICINE

## 2023-05-14 PROCEDURE — 96375 TX/PRO/DX INJ NEW DRUG ADDON: CPT

## 2023-05-14 PROCEDURE — 74176 CT ABD & PELVIS W/O CONTRAST: CPT

## 2023-05-14 PROCEDURE — 99284 EMERGENCY DEPT VISIT MOD MDM: CPT

## 2023-05-14 PROCEDURE — 83690 ASSAY OF LIPASE: CPT | Performed by: EMERGENCY MEDICINE

## 2023-05-14 PROCEDURE — G0378 HOSPITAL OBSERVATION PER HR: HCPCS

## 2023-05-14 PROCEDURE — 25010000002 MORPHINE PER 10 MG: Performed by: EMERGENCY MEDICINE

## 2023-05-14 PROCEDURE — 82550 ASSAY OF CK (CPK): CPT | Performed by: EMERGENCY MEDICINE

## 2023-05-14 PROCEDURE — 25010000002 ONDANSETRON PER 1 MG: Performed by: EMERGENCY MEDICINE

## 2023-05-14 PROCEDURE — 93005 ELECTROCARDIOGRAM TRACING: CPT | Performed by: NURSE PRACTITIONER

## 2023-05-14 PROCEDURE — 25010000002 HYDROMORPHONE 1 MG/ML SOLUTION: Performed by: EMERGENCY MEDICINE

## 2023-05-14 PROCEDURE — 25010000002 HYDROMORPHONE PER 4 MG: Performed by: FAMILY MEDICINE

## 2023-05-14 PROCEDURE — 96374 THER/PROPH/DIAG INJ IV PUSH: CPT

## 2023-05-14 PROCEDURE — 84484 ASSAY OF TROPONIN QUANT: CPT | Performed by: NURSE PRACTITIONER

## 2023-05-14 PROCEDURE — 72158 MRI LUMBAR SPINE W/O & W/DYE: CPT

## 2023-05-14 PROCEDURE — 0 GADOBENATE DIMEGLUMINE 529 MG/ML SOLUTION: Performed by: EMERGENCY MEDICINE

## 2023-05-14 PROCEDURE — 80053 COMPREHEN METABOLIC PANEL: CPT | Performed by: EMERGENCY MEDICINE

## 2023-05-14 PROCEDURE — 63710000001 ONDANSETRON PER 8 MG: Performed by: FAMILY MEDICINE

## 2023-05-14 RX ORDER — BUDESONIDE AND FORMOTEROL FUMARATE DIHYDRATE 160; 4.5 UG/1; UG/1
2 AEROSOL RESPIRATORY (INHALATION)
Status: DISCONTINUED | OUTPATIENT
Start: 2023-05-14 | End: 2023-05-15 | Stop reason: HOSPADM

## 2023-05-14 RX ORDER — ACETAMINOPHEN 500 MG
500 TABLET ORAL 4 TIMES DAILY
Status: DISCONTINUED | OUTPATIENT
Start: 2023-05-14 | End: 2023-05-15 | Stop reason: HOSPADM

## 2023-05-14 RX ORDER — MORPHINE SULFATE 4 MG/ML
4 INJECTION, SOLUTION INTRAMUSCULAR; INTRAVENOUS ONCE
Status: COMPLETED | OUTPATIENT
Start: 2023-05-14 | End: 2023-05-14

## 2023-05-14 RX ORDER — SODIUM CHLORIDE 0.9 % (FLUSH) 0.9 %
10 SYRINGE (ML) INJECTION AS NEEDED
Status: DISCONTINUED | OUTPATIENT
Start: 2023-05-14 | End: 2023-05-15 | Stop reason: HOSPADM

## 2023-05-14 RX ORDER — SODIUM CHLORIDE 0.9 % (FLUSH) 0.9 %
10 SYRINGE (ML) INJECTION EVERY 12 HOURS SCHEDULED
Status: DISCONTINUED | OUTPATIENT
Start: 2023-05-14 | End: 2023-05-15 | Stop reason: HOSPADM

## 2023-05-14 RX ORDER — GABAPENTIN 300 MG/1
300 CAPSULE ORAL 3 TIMES DAILY PRN
Status: DISCONTINUED | OUTPATIENT
Start: 2023-05-14 | End: 2023-05-15

## 2023-05-14 RX ORDER — SODIUM CHLORIDE 9 MG/ML
10 INJECTION INTRAVENOUS AS NEEDED
Status: DISCONTINUED | OUTPATIENT
Start: 2023-05-14 | End: 2023-05-15

## 2023-05-14 RX ORDER — AMOXICILLIN 250 MG
2 CAPSULE ORAL 2 TIMES DAILY
Status: DISCONTINUED | OUTPATIENT
Start: 2023-05-14 | End: 2023-05-15 | Stop reason: HOSPADM

## 2023-05-14 RX ORDER — ONDANSETRON 2 MG/ML
4 INJECTION INTRAMUSCULAR; INTRAVENOUS ONCE
Status: COMPLETED | OUTPATIENT
Start: 2023-05-14 | End: 2023-05-14

## 2023-05-14 RX ORDER — KETOROLAC TROMETHAMINE 30 MG/ML
30 INJECTION, SOLUTION INTRAMUSCULAR; INTRAVENOUS EVERY 6 HOURS PRN
Status: DISCONTINUED | OUTPATIENT
Start: 2023-05-14 | End: 2023-05-15

## 2023-05-14 RX ORDER — DIAZEPAM 5 MG/1
5 TABLET ORAL ONCE
Status: COMPLETED | OUTPATIENT
Start: 2023-05-14 | End: 2023-05-14

## 2023-05-14 RX ORDER — OXYCODONE HYDROCHLORIDE AND ACETAMINOPHEN 5; 325 MG/1; MG/1
1 TABLET ORAL EVERY 4 HOURS PRN
Status: DISCONTINUED | OUTPATIENT
Start: 2023-05-14 | End: 2023-05-15

## 2023-05-14 RX ORDER — CYCLOBENZAPRINE HCL 10 MG
5 TABLET ORAL 3 TIMES DAILY PRN
Status: DISCONTINUED | OUTPATIENT
Start: 2023-05-14 | End: 2023-05-15 | Stop reason: HOSPADM

## 2023-05-14 RX ORDER — CALCIUM CARBONATE 200(500)MG
1 TABLET,CHEWABLE ORAL 3 TIMES DAILY PRN
Status: DISCONTINUED | OUTPATIENT
Start: 2023-05-14 | End: 2023-05-15 | Stop reason: HOSPADM

## 2023-05-14 RX ORDER — BISACODYL 5 MG/1
5 TABLET, DELAYED RELEASE ORAL DAILY PRN
Status: DISCONTINUED | OUTPATIENT
Start: 2023-05-14 | End: 2023-05-15 | Stop reason: HOSPADM

## 2023-05-14 RX ORDER — ONDANSETRON 4 MG/1
4 TABLET, FILM COATED ORAL EVERY 6 HOURS PRN
Status: DISCONTINUED | OUTPATIENT
Start: 2023-05-14 | End: 2023-05-15 | Stop reason: HOSPADM

## 2023-05-14 RX ORDER — TAMSULOSIN HYDROCHLORIDE 0.4 MG/1
0.4 CAPSULE ORAL NIGHTLY
Status: DISCONTINUED | OUTPATIENT
Start: 2023-05-14 | End: 2023-05-15 | Stop reason: HOSPADM

## 2023-05-14 RX ORDER — BISACODYL 10 MG
10 SUPPOSITORY, RECTAL RECTAL DAILY PRN
Status: DISCONTINUED | OUTPATIENT
Start: 2023-05-14 | End: 2023-05-15 | Stop reason: HOSPADM

## 2023-05-14 RX ORDER — TRAMADOL HYDROCHLORIDE 50 MG/1
50 TABLET ORAL EVERY 6 HOURS PRN
Status: DISCONTINUED | OUTPATIENT
Start: 2023-05-14 | End: 2023-05-15 | Stop reason: HOSPADM

## 2023-05-14 RX ORDER — NALOXONE HCL 0.4 MG/ML
0.4 VIAL (ML) INJECTION
Status: DISCONTINUED | OUTPATIENT
Start: 2023-05-14 | End: 2023-05-15

## 2023-05-14 RX ORDER — HYDROMORPHONE HYDROCHLORIDE 1 MG/ML
0.5 INJECTION, SOLUTION INTRAMUSCULAR; INTRAVENOUS; SUBCUTANEOUS
Status: DISCONTINUED | OUTPATIENT
Start: 2023-05-14 | End: 2023-05-15

## 2023-05-14 RX ORDER — SODIUM CHLORIDE 9 MG/ML
40 INJECTION, SOLUTION INTRAVENOUS AS NEEDED
Status: DISCONTINUED | OUTPATIENT
Start: 2023-05-14 | End: 2023-05-15 | Stop reason: HOSPADM

## 2023-05-14 RX ORDER — POLYETHYLENE GLYCOL 3350 17 G/17G
17 POWDER, FOR SOLUTION ORAL DAILY PRN
Status: DISCONTINUED | OUTPATIENT
Start: 2023-05-14 | End: 2023-05-15 | Stop reason: HOSPADM

## 2023-05-14 RX ADMIN — SODIUM CHLORIDE 40 ML: 9 INJECTION, SOLUTION INTRAVENOUS at 20:22

## 2023-05-14 RX ADMIN — DIAZEPAM 5 MG: 5 TABLET ORAL at 11:07

## 2023-05-14 RX ADMIN — HYDROMORPHONE HYDROCHLORIDE 1 MG: 1 INJECTION, SOLUTION INTRAMUSCULAR; INTRAVENOUS; SUBCUTANEOUS at 10:04

## 2023-05-14 RX ADMIN — ONDANSETRON HYDROCHLORIDE 4 MG: 4 TABLET, FILM COATED ORAL at 20:22

## 2023-05-14 RX ADMIN — SENNOSIDES AND DOCUSATE SODIUM 2 TABLET: 50; 8.6 TABLET ORAL at 20:22

## 2023-05-14 RX ADMIN — HYDROMORPHONE HYDROCHLORIDE 0.5 MG: 1 INJECTION, SOLUTION INTRAMUSCULAR; INTRAVENOUS; SUBCUTANEOUS at 14:59

## 2023-05-14 RX ADMIN — OXYCODONE HYDROCHLORIDE AND ACETAMINOPHEN 1 TABLET: 5; 325 TABLET ORAL at 15:05

## 2023-05-14 RX ADMIN — Medication 10 ML: at 20:23

## 2023-05-14 RX ADMIN — KETOROLAC TROMETHAMINE 30 MG: 30 INJECTION, SOLUTION INTRAMUSCULAR; INTRAVENOUS at 15:05

## 2023-05-14 RX ADMIN — ACETAMINOPHEN 500 MG: 500 TABLET ORAL at 20:22

## 2023-05-14 RX ADMIN — SODIUM CHLORIDE 1000 ML: 9 INJECTION, SOLUTION INTRAVENOUS at 10:45

## 2023-05-14 RX ADMIN — TAMSULOSIN HYDROCHLORIDE 0.4 MG: 0.4 CAPSULE ORAL at 20:23

## 2023-05-14 RX ADMIN — GADOBENATE DIMEGLUMINE 20 ML: 529 INJECTION, SOLUTION INTRAVENOUS at 14:39

## 2023-05-14 RX ADMIN — ONDANSETRON HYDROCHLORIDE 4 MG: 4 TABLET, FILM COATED ORAL at 15:57

## 2023-05-14 RX ADMIN — ONDANSETRON 4 MG: 2 INJECTION INTRAMUSCULAR; INTRAVENOUS at 10:04

## 2023-05-14 RX ADMIN — CYCLOBENZAPRINE 5 MG: 10 TABLET, FILM COATED ORAL at 20:22

## 2023-05-14 RX ADMIN — MORPHINE SULFATE 4 MG: 4 INJECTION, SOLUTION INTRAMUSCULAR; INTRAVENOUS at 10:45

## 2023-05-14 RX ADMIN — BUDESONIDE AND FORMOTEROL FUMARATE DIHYDRATE 2 PUFF: 160; 4.5 AEROSOL RESPIRATORY (INHALATION) at 20:00

## 2023-05-14 NOTE — PLAN OF CARE
Goal Outcome Evaluation:  Plan of Care Reviewed With: patient   Patient Aox4 upon arrival to the floor from the ED. Patient complained of back/flank pain and was medicated per MAR. Patient complained of nausea and was medicated per MAR. Family remains at the bedside. Call light is within reach.     Progress: no change

## 2023-05-14 NOTE — H&P
[facility] Medicine Services  HISTORY AND PHYSICAL    Patient Name: Khang Abebe  : 1955  MRN: [id number]  Primary Care Physician: Roma, No Known  Date of admission: 2023      Subjective   Subjective     Chief Complaint:  Back pain    HPI:  Khang Abebe is a 67 y.o. male who presented to the emergency department with complaints of worsening back pain and difficulty ambulating.  Patient was seen in MRI after getting MRI of the spine.  He reports increasing frequency and intensity of lower back pain with radiation to the both lower extremities with occasional numbness tingling.  He cannot get comfortable.  He also describes muscle spasms.  He does have a history of neuropathy as well as a history of prostate cancer.  He had radiation for the prostate cancer.  He reports increased urinary frequency but no bowel or bladder incontinence.  Symptoms are exacerbated by lying down in certain positions.  Symptoms are alleviated by pain medication.  Several days ago he had a gastroenteritis he thought was viral lasted 3 to 4 days.  He did have some diarrhea but no respiratory symptoms.      Review of Systems   General: No fever, chills, fatigue  ENT: No sore throat, trouble swallowing or changes in vision  Respiratory: No shortness of breath, cough, wheezing or fast breathing  Cardiovascular: No chest pain, palpitations, dyspnea with exertion  Gastrointestinal: Positive nausea, denies vomiting, denies abdominal pain  Musculoskeletal: Positive difficulty walking, positive weakness  Vascular: No cyanosis or clubbing  Lymphatic: No peripheral edema, no lymphadenopathy  Neurologic: No headache, confusion, dizziness; positive neuropathy  Psychiatric: No changes in mood.  No depression or anxiety.      Personal History     Past Medical History:   Diagnosis Date   • Actinic keratosis    • Asthma    • Bronchitis    • CAP (community acquired pneumonia)    • Cardiac abnormality    • Fatigue    •  H/O chest x-ray 02/17/2016    Interstitial and granulomatous scarring is seen centrally and peripherallly in the chest. Superimposed active disease or consolidation is not identified   • H/O chest x-ray 07/12/2014    No acute cardiopulmonary process   • H/O echocardiogram 10/31/2014    Normal LVSF. Est EF 60-65%. Mild mitral valve prolpase. Trace MR   • History of PFTs 03/24/2016    PFT acceptable and reproducible. Pt gave best effort. Normal PFT. Normal lung volumes and normal diffusion   • Low testosterone    • Meralgia paresthetica    • Neuropathy    • Sinusitis    • Sleep apnea    • Strep pharyngitis          Oncology Problem List:  Prostate cancer (05/14/2023; Status: Active)  Basal cell carcinoma (03/13/2017; Status: Active)       Past Surgical History:   Procedure Laterality Date   • CHOLECYSTECTOMY     • HERNIA REPAIR      Umbilical Hernia Repair   • NOSE SURGERY      Nasal Septum Deviation Repair   • OTHER SURGICAL HISTORY      Esophageal dilation       Family History: family history includes Alcohol abuse in his father; Heart disease in an other family member; Liver cancer in his father; Lung cancer in his father; Other in an other family member.     Social History:  reports that he has never smoked. He has never used smokeless tobacco. He reports that he does not drink alcohol and does not use drugs.  Social History     Social History Narrative   • Not on file       Medications:  Available home medication information reviewed.  (Not in a hospital admission)      Allergies   Allergen Reactions   • Cefaclor Unknown (See Comments)     Had a reaction in the past. No reaction recently.   • Ciprofloxacin Other (See Comments)   • Levofloxacin Other (See Comments)   • Other Other (See Comments)     Pet Dander-allergies   • Sulfa Antibiotics GI Intolerance       Objective   Objective     Vital Signs:   Temp:  [97.9 °F (36.6 °C)] 97.9 °F (36.6 °C)  Heart Rate:  [46-65] 50  Resp:  [18] 18  BP: (124-179)/()  124/76       Physical Exam   Constitutional: Mild distress, awake, alert, nontoxic, normal body habitus; writhing in the stretcher cannot get comfortable  Eyes: Pupils equal, sclerae anicteric, no conjunctival injection  HENT: NCAT, mucous membranes moist  Neck: Supple, no thyromegaly, no lymphadenopathy  Respiratory: Clear to auscultation bilaterally, good effort, nonlabored respirations   Cardiovascular: RRR, no murmur  Gastrointestinal: Positive bowel sounds, soft, nontender, nondistended  Musculoskeletal: No peripheral edema, normal muscle tone for age; positive straight leg raise  Psychiatric: Appropriate affect, good insight and judgement, cooperative  Neurologic: Oriented x 3, movements symmetric BUE and BLE, Cranial Nerves grossly intact, speech clear and fluent  Skin: No rashes, no jaundice, no petechiae, no mottling      Result Review:  I have personally reviewed the results from the time of this admission to 5/14/2023 15:04 EDT and agree with these findings:  [x]  Laboratory list / accordion  []  Microbiology  [x]  Radiology  []  EKG/Telemetry   []  Cardiology/Vascular   []  Pathology  []  Old records  []  Other:  Most notable findings include: Normal white blood cell count, normal hemoglobin, basic metabolic panel normal, urinalysis normal, CT of the abdomen normal with postsurgical changes from Kristin-en-Y gastric bypass.        LAB RESULTS:      Lab 05/14/23  0948   WBC 5.22   HEMOGLOBIN 14.7   HEMATOCRIT 44.1   PLATELETS 235   NEUTROS ABS 3.68   IMMATURE GRANS (ABS) 0.02   LYMPHS ABS 0.81   MONOS ABS 0.31   EOS ABS 0.36   MCV 85.1   LACTATE 0.6         Lab 05/14/23  0948   SODIUM 138   POTASSIUM 4.3   CHLORIDE 108*   CO2 22.0   ANION GAP 8.0   BUN 13   CREATININE 0.76   EGFR 99.7   GLUCOSE 115*   CALCIUM 10.0         Lab 05/14/23  0948   TOTAL PROTEIN 6.7   ALBUMIN 4.1   GLOBULIN 2.6   ALT (SGPT) 18   AST (SGOT) 24   BILIRUBIN 0.4   ALK PHOS 71   LIPASE 21         Lab 05/14/23  0948   HSTROP T 6                  UA        5/14/2023    11:10   Urinalysis   Specific [place], UA 1.015     Ketones, UA Negative     Blood, UA Negative     Leukocytes, UA Negative     Nitrite, UA Negative         Microbiology Results (last 10 days)     ** No results found for the last 240 hours. **          CT Abdomen Pelvis Without Contrast    Result Date: 5/14/2023  CT ABDOMEN PELVIS WO CONTRAST Date of Exam: 5/14/2023 10:30 AM EDT Indication: low back pain. bilateral flank pain.. Comparison: None available. Technique: Axial CT images were obtained of the abdomen and pelvis without the administration of contrast. Reconstructed coronal and sagittal images were also obtained. Automated exposure control and iterative construction methods were used. Findings: Lower thorax:Lung bases are clear. No coronary artery calcifications seen in the visualized heart. No pericardial effusion.  Liver: No focal hepatic lesions seen.  Normal hepatic size. Normal density of the liver. Gallbladder and bile ducts: Cholecystectomy clips. No intra- or extra- hepatic biliary ductal dilatation. Spleen: Normal appearance of the spleen. Pancreas: Senescent atrophy of the pancreas. Main pancreatic duct is nondilated. Adrenals: Normal appearance of the adrenal glands. Kidneys: Right renal cysts. No nephrolithiasis.  Normal caliber of the ureters. Bowel: Postoperative changes of Kristin-en-Y gastric bypass. Moderate hiatal hernia of the gastric pouch. Normal caliber of the bowel. Moderate colonic stool burden. Normal appearance of the appendix. Diverticulosis without diverticulitis. Pelvis: Normal appearance of the bladder. Normal appearance of the prostate. Seminal vesicles appear normal. Peritoneum: No free air. No free fluid. No peritoneal nodularity. Vessels: Normal aortic caliber.  No atherosclerotic calcification of the aorta. Lymph nodes: No enlarged or suspicious adenopathy. Bones: No acute osseous abnormality. Degenerative changes of the spine. Soft tissues:  Unremarkable appearance of the soft tissues.     Impression: Impression: No evidence of acute intra-abdominal abnormality. Postoperative changes of Kristin-en-Y gastric bypass with moderate hiatal hernia of the gastric pouch. 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  MRI done in the ER is pending.    Intractable back pain  History of prostate cancer  Peripheral neuropathy  -MRI pending  -Differential includes degenerative disc disease, worsening neuropathy, metastatic disease possible, muscle strain  -Continue pain medication including Dilaudid, Percocet, added Toradol  -Scheduled Tylenol 500 mg 4 times daily x3 days  -Flexeril 3 times daily as needed  -May benefit from PT consult pending progress    Gastroesophageal reflux disease  History of Kristin-en-Y gastric bypass  IBS  Nausea  -Discussed risk of anti-inflammatory versus benefit  -We will proceed with short course of anti-inflammatory with Toradol  -Antiemetics as needed  -Tums as needed    History of interstitial lung disease  Obstructive sleep apnea  -Continue home nebs, CPAP    Hyperlipidemia  -No home meds listed    DVT prophylaxis: Venous foot pumps      CODE STATUS: Full code  Code Status and Medical Interventions:   Ordered at: 05/14/23 1429     Code Status (Patient has no pulse and is not breathing):    CPR (Attempt to Resuscitate)      Medical Interventions (Patient has pulse or is breathing):    Full Support       Expected Discharge   Expected Discharge Date: 5/16/2023; Expected Discharge Time:      Alina Rockwell MD  05/14/23

## 2023-05-14 NOTE — ED PROVIDER NOTES
Subjective   History of Present Illness  Pleasant patient presents the ER for low back and bilateral flank pain for the last several days.  Nothing really makes it better or worse.  He feels like he cannot sit sit still and he needs to pace.  He does have urinary frequency.  No history of kidney stones or urinary tract infections.  Denies any chest pain or difficulty breathing.  He denies any fever or chills.  He does have pain that goes into his right leg and also left leg at times.  Tells me he takes tramadol that is prescribed by his regular doctor typically helps him with previous pain he has had.  Tells me he fell through a ceiling several years ago and has quite a bit of hardware in his lower extremities.        Review of Systems    Past Medical History:   Diagnosis Date   • Actinic keratosis    • Asthma    • Bronchitis    • CAP (community acquired pneumonia)    • Cardiac abnormality    • Fatigue    • H/O chest x-ray 02/17/2016    Interstitial and granulomatous scarring is seen centrally and peripherallly in the chest. Superimposed active disease or consolidation is not identified   • H/O chest x-ray 07/12/2014    No acute cardiopulmonary process   • H/O echocardiogram 10/31/2014    Normal LVSF. Est EF 60-65%. Mild mitral valve prolpase. Trace MR   • History of PFTs 03/24/2016    PFT acceptable and reproducible. Pt gave best effort. Normal PFT. Normal lung volumes and normal diffusion   • Low testosterone    • Meralgia paresthetica    • Neuropathy    • Sinusitis    • Sleep apnea    • Strep pharyngitis        Allergies   Allergen Reactions   • Cefaclor Unknown (See Comments)     Had a reaction in the past. No reaction recently.   • Ciprofloxacin Other (See Comments)   • Levofloxacin Other (See Comments)   • Other Other (See Comments)     Pet Dander-allergies   • Sulfa Antibiotics GI Intolerance       Past Surgical History:   Procedure Laterality Date   • CHOLECYSTECTOMY     • HERNIA REPAIR      Umbilical Hernia  Repair   • NOSE SURGERY      Nasal Septum Deviation Repair   • OTHER SURGICAL HISTORY      Esophageal dilation       Family History   Problem Relation Age of Onset   • Alcohol abuse Father    • Liver cancer Father    • Lung cancer Father    • Heart disease Other         coronary arteriosclerosis   • Other Other         PVD       Social History     Socioeconomic History   • Marital status:    Tobacco Use   • Smoking status: Never   • Smokeless tobacco: Never   Vaping Use   • Vaping Use: Never used   Substance and Sexual Activity   • Alcohol use: No   • Drug use: Never   • Sexual activity: Defer           Objective   Physical Exam  Constitutional:       Appearance: He is well-developed.   HENT:      Head: Normocephalic and atraumatic.      Right Ear: External ear normal.      Left Ear: External ear normal.      Nose: Nose normal.   Eyes:      Conjunctiva/sclera: Conjunctivae normal.      Pupils: Pupils are equal, round, and reactive to light.   Cardiovascular:      Rate and Rhythm: Normal rate and regular rhythm.      Heart sounds: Normal heart sounds.   Pulmonary:      Effort: Pulmonary effort is normal.      Breath sounds: Normal breath sounds.   Abdominal:      General: Bowel sounds are normal.      Palpations: Abdomen is soft.   Musculoskeletal:         General: Normal range of motion.      Cervical back: Normal range of motion and neck supple.      Comments: Paravertebral lumbar spinal tenderness.  Painful range of motion sitting up in bed.  Does feel better to stand.   Skin:     General: Skin is warm and dry.   Neurological:      Mental Status: He is alert and oriented to person, place, and time.   Psychiatric:         Behavior: Behavior normal.         Judgment: Judgment normal.         Procedures           ED Course  ED Course as of 05/14/23 1509   Sun May 14, 2023   1054 His differential includes kidney stones.  Kidney infection.  Aneurysm dissection.  Urinary tract infection.  Patient is a very broad  presentation.  Reassuringly denies any chest pain or difficulty breathing.  He presents similar to a kidney stone with flank pain and low back pain although he also has radiculopathy into his right leg sometimes his left leg.  He did have a substantial injury back in 2019 where he fell through a roof.  He is on tramadol prescribed by his regular doctor.  He is also very tall individual.  Symptoms have been present for the last 2 to 3 days.  He is able to stand up and walk around with some pain.  Have also considered other causes as well especially dissection aneurysm. [JM]   8897 I had a long conversation with the patient and his wife at the bedside.  He is still having quite a bit of pain particular in his low back.  He does describe some radicular type symptoms.  No evidence of kidney stones or pyelonephritis acute heart attack.  Tells me does have some pain down his leg symptoms his right stump to his left.  He denies any numbness or tingling in his groin.  He denies any loss of bowel or bladder. [JM]   1934 He did describe a fall he had several years ago where he fell through a roof and had multiple fractures to bilateral legs.  Is a very tall individual with quite a bit of degeneration on the CAT scan.  I do think he has underlying degeneration of his spine this could be related to a disc issue as well.  I think it is reasonable to get MRI of his back as he has had multiple doses of pain medication and we are unable to make him comfortable.  There is no red flag cauda equina symptoms.  We have to be careful with steroid use as he has had a Kristin-en-Y and he is not supposed to be on NSAIDs or steroids.  I plan to admit to the hospital. [JM]      ED Course User Index  [JM] Pepito Weinberg, SEAN            CT Abdomen Pelvis Without Contrast   Final Result   Impression:   No evidence of acute intra-abdominal abnormality.      Postoperative changes of Kristin-en-Y gastric bypass with moderate hiatal hernia of the gastric  pouch.                  Electronically Signed: Vincenzo Hernandez     5/14/2023 10:51 AM EDT     Workstation ID: [id number]      MRI Lumbar Spine With & Without Contrast    (Results Pending)                                       Medical Decision Making  History of prostate cancer: complicated acute illness or injury  Intractable back pain: complicated acute illness or injury  Lumbar radiculopathy: complicated acute illness or injury  Amount and/or Complexity of Data Reviewed  External Data Reviewed: labs, radiology, ECG and notes.  Labs: ordered. Decision-making details documented in ED Course.  Radiology: ordered. Decision-making details documented in ED Course.  ECG/medicine tests: ordered. Decision-making details documented in ED Course.      Risk  OTC drugs.  Prescription drug management.  Parenteral controlled substances.  Decision regarding hospitalization.          Final diagnoses:   Intractable back pain   Lumbar radiculopathy   History of prostate cancer       ED Disposition  ED Disposition     ED Disposition   Decision to Admit    Condition   --    Comment   Level of Care: Telemetry [5]   Diagnosis: Intractable back pain [400331]   Admitting Physician: JASMEET ASHLEY [[id number]]   Attending Physician: JASMEET ASHLEY [[id number]]               No follow-up provider specified.       Medication List      No changes were made to your prescriptions during this visit.          Pepito Weinberg APRN  05/14/23 1419       Pepito Weinberg APRN  05/14/23 1421       Pepito Weinberg APRN  05/14/23 1505

## 2023-05-14 NOTE — NURSING NOTE
Patient refusing scheduled pain medication. Patient educated on the importance of taking scheduled medication to maintain adequate pain management. Patient stated he wanted the Dilaudid and then the percocet. Patient and family informed that PRN medications cannot be given when the pain score does not match the parameters for the PRN dose. Patient rated pain 2/10. Patient was offered scheduled 650mg of tylenol but refused. Patient and family verbalized understanding of the PRN pain medication policy.

## 2023-05-15 VITALS
SYSTOLIC BLOOD PRESSURE: 128 MMHG | BODY MASS INDEX: 33.12 KG/M2 | TEMPERATURE: 97.5 F | HEART RATE: 55 BPM | DIASTOLIC BLOOD PRESSURE: 83 MMHG | RESPIRATION RATE: 21 BRPM | OXYGEN SATURATION: 97 % | WEIGHT: 286.3 LBS | HEIGHT: 78 IN

## 2023-05-15 LAB
ANION GAP SERPL CALCULATED.3IONS-SCNC: 6 MMOL/L (ref 5–15)
BASOPHILS # BLD AUTO: 0.03 10*3/MM3 (ref 0–0.2)
BASOPHILS NFR BLD AUTO: 0.4 % (ref 0–1.5)
BUN SERPL-MCNC: 12 MG/DL (ref 8–23)
BUN/CREAT SERPL: 13.8 (ref 7–25)
CALCIUM SPEC-SCNC: 10 MG/DL (ref 8.6–10.5)
CHLORIDE SERPL-SCNC: 109 MMOL/L (ref 98–107)
CO2 SERPL-SCNC: 26 MMOL/L (ref 22–29)
CREAT SERPL-MCNC: 0.87 MG/DL (ref 0.76–1.27)
DEPRECATED RDW RBC AUTO: 39.5 FL (ref 37–54)
EGFRCR SERPLBLD CKD-EPI 2021: 95.8 ML/MIN/1.73
EOSINOPHIL # BLD AUTO: 0.13 10*3/MM3 (ref 0–0.4)
EOSINOPHIL NFR BLD AUTO: 1.6 % (ref 0.3–6.2)
ERYTHROCYTE [DISTWIDTH] IN BLOOD BY AUTOMATED COUNT: 12.6 % (ref 12.3–15.4)
GLUCOSE SERPL-MCNC: 98 MG/DL (ref 65–99)
HCT VFR BLD AUTO: 44.1 % (ref 37.5–51)
HGB BLD-MCNC: 14.2 G/DL (ref 13–17.7)
IMM GRANULOCYTES # BLD AUTO: 0.03 10*3/MM3 (ref 0–0.05)
IMM GRANULOCYTES NFR BLD AUTO: 0.4 % (ref 0–0.5)
LYMPHOCYTES # BLD AUTO: 0.73 10*3/MM3 (ref 0.7–3.1)
LYMPHOCYTES NFR BLD AUTO: 8.8 % (ref 19.6–45.3)
MCH RBC QN AUTO: 27.7 PG (ref 26.6–33)
MCHC RBC AUTO-ENTMCNC: 32.2 G/DL (ref 31.5–35.7)
MCV RBC AUTO: 86.1 FL (ref 79–97)
MONOCYTES # BLD AUTO: 0.41 10*3/MM3 (ref 0.1–0.9)
MONOCYTES NFR BLD AUTO: 5 % (ref 5–12)
NEUTROPHILS NFR BLD AUTO: 6.94 10*3/MM3 (ref 1.7–7)
NEUTROPHILS NFR BLD AUTO: 83.8 % (ref 42.7–76)
NRBC BLD AUTO-RTO: 0 /100 WBC (ref 0–0.2)
PLATELET # BLD AUTO: 253 10*3/MM3 (ref 140–450)
PMV BLD AUTO: 10.1 FL (ref 6–12)
POTASSIUM SERPL-SCNC: 4.4 MMOL/L (ref 3.5–5.2)
QT INTERVAL: 422 MS
QTC INTERVAL: 414 MS
RBC # BLD AUTO: 5.12 10*6/MM3 (ref 4.14–5.8)
SODIUM SERPL-SCNC: 141 MMOL/L (ref 136–145)
WBC NRBC COR # BLD: 8.27 10*3/MM3 (ref 3.4–10.8)

## 2023-05-15 PROCEDURE — 96376 TX/PRO/DX INJ SAME DRUG ADON: CPT

## 2023-05-15 PROCEDURE — 94664 DEMO&/EVAL PT USE INHALER: CPT

## 2023-05-15 PROCEDURE — 85025 COMPLETE CBC W/AUTO DIFF WBC: CPT | Performed by: FAMILY MEDICINE

## 2023-05-15 PROCEDURE — 80048 BASIC METABOLIC PNL TOTAL CA: CPT | Performed by: FAMILY MEDICINE

## 2023-05-15 PROCEDURE — 25010000002 HYDROMORPHONE PER 4 MG: Performed by: FAMILY MEDICINE

## 2023-05-15 PROCEDURE — G0378 HOSPITAL OBSERVATION PER HR: HCPCS

## 2023-05-15 PROCEDURE — 94799 UNLISTED PULMONARY SVC/PX: CPT

## 2023-05-15 RX ORDER — METHYLPREDNISOLONE 4 MG/1
TABLET ORAL
Qty: 21 TABLET | Refills: 0 | Status: SHIPPED | OUTPATIENT
Start: 2023-05-15

## 2023-05-15 RX ORDER — OXYCODONE HYDROCHLORIDE AND ACETAMINOPHEN 5; 325 MG/1; MG/1
1 TABLET ORAL EVERY 4 HOURS PRN
Status: DISCONTINUED | OUTPATIENT
Start: 2023-05-15 | End: 2023-05-15 | Stop reason: HOSPADM

## 2023-05-15 RX ORDER — GABAPENTIN 300 MG/1
300 CAPSULE ORAL 3 TIMES DAILY PRN
Status: DISCONTINUED | OUTPATIENT
Start: 2023-05-15 | End: 2023-05-15 | Stop reason: HOSPADM

## 2023-05-15 RX ORDER — OXYCODONE HYDROCHLORIDE AND ACETAMINOPHEN 5; 325 MG/1; MG/1
1 TABLET ORAL EVERY 6 HOURS PRN
Qty: 12 TABLET | Refills: 0 | Status: SHIPPED | OUTPATIENT
Start: 2023-05-15 | End: 2023-05-21

## 2023-05-15 RX ORDER — METAXALONE 800 MG/1
800 TABLET ORAL DAILY PRN
COMMUNITY
End: 2023-05-15 | Stop reason: HOSPADM

## 2023-05-15 RX ORDER — CETIRIZINE HYDROCHLORIDE 10 MG/1
10 TABLET ORAL DAILY
COMMUNITY

## 2023-05-15 RX ORDER — SUMATRIPTAN 50 MG/1
50 TABLET, FILM COATED ORAL ONCE
Status: COMPLETED | OUTPATIENT
Start: 2023-05-15 | End: 2023-05-15

## 2023-05-15 RX ORDER — CYCLOBENZAPRINE HCL 5 MG
5 TABLET ORAL 3 TIMES DAILY PRN
Qty: 30 TABLET | Refills: 0 | Status: SHIPPED | OUTPATIENT
Start: 2023-05-15

## 2023-05-15 RX ADMIN — BUDESONIDE AND FORMOTEROL FUMARATE DIHYDRATE 2 PUFF: 160; 4.5 AEROSOL RESPIRATORY (INHALATION) at 09:51

## 2023-05-15 RX ADMIN — SENNOSIDES AND DOCUSATE SODIUM 2 TABLET: 50; 8.6 TABLET ORAL at 09:37

## 2023-05-15 RX ADMIN — OXYCODONE HYDROCHLORIDE AND ACETAMINOPHEN 1 TABLET: 5; 325 TABLET ORAL at 05:45

## 2023-05-15 RX ADMIN — Medication 10 ML: at 09:38

## 2023-05-15 RX ADMIN — HYDROMORPHONE HYDROCHLORIDE 0.5 MG: 1 INJECTION, SOLUTION INTRAMUSCULAR; INTRAVENOUS; SUBCUTANEOUS at 09:38

## 2023-05-15 RX ADMIN — HYDROMORPHONE HYDROCHLORIDE 0.5 MG: 1 INJECTION, SOLUTION INTRAMUSCULAR; INTRAVENOUS; SUBCUTANEOUS at 05:39

## 2023-05-15 RX ADMIN — SUMATRIPTAN SUCCINATE 50 MG: 50 TABLET ORAL at 12:27

## 2023-05-15 NOTE — DISCHARGE SUMMARY
[facility] Medicine Services  DISCHARGE SUMMARY    Patient Name: Khang Abebe  : 1955  MRN: [id number]    Date of Admission: 2023  9:26 AM  Date of Discharge:  5/15/2023  Primary Care Physician: Provider, No Known    Consults     No orders found for last 30 day(s).          Hospital Course     Presenting Problem:   Intractable back pain [M54.9]    Active Hospital Problems    Diagnosis  POA   • **Intractable back pain [M54.9]  Yes   • Prostate cancer [C61]  Yes   • History of Kristin-en-Y gastric bypass [Z98.84]  Not Applicable   • GERD (gastroesophageal reflux disease) [K21.9]  Yes   • Peripheral neuropathy [G62.9]  Yes   • LIV (obstructive sleep apnea) [G47.33]  Yes   • Interstitial lung disease [J84.9]  Yes   • Hyperlipidemia [E78.5]  Yes   • IBS (irritable bowel syndrome) [K58.9]  Yes      Resolved Hospital Problems   No resolved problems to display.          Hospital Course:  Khang Abebe is a 67 y.o. male with history of prostate cancer, LIV not currently on treatment (reports intolerance of CPAP mask), chronic low back pain followed and managed with chronic tramadol and recent change in muscle relaxant to Skelaxin.  He presented to [facility] for acute on chronic back pain, rated as severe, and was redirected to the ED.  He describes multiple areas of pain which are not any specific neuromuscular distribution (upper back, lower back, hips, thighs, bilateral in nature).  He had an unremarkable CMP, CBC, UA, CT of the abdomen/pelvis, normal CK, at bedtime troponin, lactate, lipase.  MR imaging of his lumbar spine was obtained and demonstrated chronic degenerative mild-moderate changes only.  At this time there are no life-threatening findings related to his acute/chronic pain, he is requesting to try Flexeril in exchange for Skelaxin.  I will also place him on a Medrol Dosepak for acute musculoskeletal strain and per his request and provided short course of oral Percocet to take prn  for severe flareups of his pain.  He should follow-up with his PCP/chronic pain management for further chronic management.      Discharge Follow Up Recommendations for outpatient labs/diagnostics:  PCP 1-2-week    Day of Discharge     HPI:   Pain notably worse when laying still for prolonged periods of time, mattress suggested sometime overnight and improved his pain.    Review of Systems  Gen- No fevers, chills  CV- No chest pain, palpitations  Resp- No cough, dyspnea  GI- No N/V/D, abd pain    Vital Signs:   Temp:  [97.5 °F (36.4 °C)-97.8 °F (36.6 °C)] 97.5 °F (36.4 °C)  Heart Rate:  [44-67] 55  Resp:  [18-21] 21  BP: (127-142)/(71-85) 128/83  Flow (L/min):  [2] 2      Physical Exam:  Constitutional: Awake, alert, lying in bed no acute distress  HENT: NCAT, mucous membranes moist  Respiratory: Clear to auscultation bilaterally, respiratory effort normal   Cardiovascular: RRR, palpable radial pulse  Gastrointestinal: Positive bowel sounds, soft, nontender, nondistended  Musculoskeletal: No bilateral ankle edema  Psychiatric: Appropriate affect, cooperative  Neurologic: Speech clear and fluent, moving extremities spontaneously, strength symmetric bilaterally    Pertinent  and/or Most Recent Results     LAB RESULTS:      Lab 05/15/23  0421 05/14/23  0948   WBC 8.27 5.22   HEMOGLOBIN 14.2 14.7   HEMATOCRIT 44.1 44.1   PLATELETS 253 235   NEUTROS ABS 6.94 3.68   IMMATURE GRANS (ABS) 0.03 0.02   LYMPHS ABS 0.73 0.81   MONOS ABS 0.41 0.31   EOS ABS 0.13 0.36   MCV 86.1 85.1   LACTATE  --  0.6         Lab 05/15/23  0550 05/14/23  0948   SODIUM 141 138   POTASSIUM 4.4 4.3   CHLORIDE 109* 108*   CO2 26.0 22.0   ANION GAP 6.0 8.0   BUN 12 13   CREATININE 0.87 0.76   EGFR 95.8 99.7   GLUCOSE 98 115*   CALCIUM 10.0 10.0         Lab 05/14/23  0948   TOTAL PROTEIN 6.7   ALBUMIN 4.1   GLOBULIN 2.6   ALT (SGPT) 18   AST (SGOT) 24   BILIRUBIN 0.4   ALK PHOS 71   LIPASE 21         Lab 05/14/23  0948   HSTROP T 6                  Brief Urine Lab Results  (Last result in the past 365 days)      Color   Clarity   Blood   Leuk Est   Nitrite   Protein   CREAT   Urine HCG        05/14/23 1110 Yellow   Clear   Negative   Negative   Negative   Negative               Microbiology Results (last 10 days)     ** No results found for the last 240 hours. **          CT Abdomen Pelvis Without Contrast    Result Date: 5/14/2023  CT ABDOMEN PELVIS WO CONTRAST Date of Exam: 5/14/2023 10:30 AM EDT Indication: low back pain. bilateral flank pain.. Comparison: None available. Technique: Axial CT images were obtained of the abdomen and pelvis without the administration of contrast. Reconstructed coronal and sagittal images were also obtained. Automated exposure control and iterative construction methods were used. Findings: Lower thorax:Lung bases are clear. No coronary artery calcifications seen in the visualized heart. No pericardial effusion.  Liver: No focal hepatic lesions seen.  Normal hepatic size. Normal density of the liver. Gallbladder and bile ducts: Cholecystectomy clips. No intra- or extra- hepatic biliary ductal dilatation. Spleen: Normal appearance of the spleen. Pancreas: Senescent atrophy of the pancreas. Main pancreatic duct is nondilated. Adrenals: Normal appearance of the adrenal glands. Kidneys: Right renal cysts. No nephrolithiasis.  Normal caliber of the ureters. Bowel: Postoperative changes of Kristin-en-Y gastric bypass. Moderate hiatal hernia of the gastric pouch. Normal caliber of the bowel. Moderate colonic stool burden. Normal appearance of the appendix. Diverticulosis without diverticulitis. Pelvis: Normal appearance of the bladder. Normal appearance of the prostate. Seminal vesicles appear normal. Peritoneum: No free air. No free fluid. No peritoneal nodularity. Vessels: Normal aortic caliber.  No atherosclerotic calcification of the aorta. Lymph nodes: No enlarged or suspicious adenopathy. Bones: No acute osseous abnormality.  Degenerative changes of the spine. Soft tissues: Unremarkable appearance of the soft tissues.     Impression: No evidence of acute intra-abdominal abnormality. Postoperative changes of Kristin-en-Y gastric bypass with moderate hiatal hernia of the gastric pouch. Electronically Signed: Vincenzo Hernandez  5/14/2023 10:51 AM EDT  Workstation ID: [id number]    MRI Lumbar Spine With & Without Contrast    Result Date: 5/14/2023  MRI LUMBAR SPINE W WO CONTRAST Date of Exam: 5/14/2023 2:32 PM EDT Indication: low back pain. radiates down both legs.  Comparison: None available. Technique:  Routine multiplanar/multisequence sequence images of the lumbar spine were obtained before and after the uneventful administration of 20 mL Multihance.  Findings: The last well formed disc space is labeled as L5-S1. Distal cord and conus: Normal morphology and signal. The conus medullaris terminates at L1-L2. No abnormal enhancement. Cauda equina and nerve roots: Normal morphology and signal. No abnormal enhancement. Alignment: Minimal retrolisthesis of L4 on L5. Bones: The vertebral body heights are preserved. Type I Modic changes at L4-5. No suspicious osseous lesions are demonstrated. No abnormal enhancement. Description of degenerative changes at specific levels is as follows: T12-L1: No spinal canal or neuroforaminal stenosis. L1-2: Mild facet arthropathy. No spinal canal or neural foraminal stenosis. L2-3: Minimal posterior disc bulge and mild facet arthropathy. Mild narrowing of the lateral recesses. No spinal canal or neuroforaminal stenosis. L3-4: Minimal posterior disc bulge and mild facet arthropathy with effusions. Mild narrowing of the left greater than right lateral recess. Minimal spinal canal narrowing. No neuroforaminal stenosis. L4-5: Right eccentric disc bulge and facet arthropathy with effusions. Right greater than left ligamentum flavum hypertrophy. Narrowing of the right greater than left lateral recess. Mild spinal canal  stenosis. Moderate right and mild left neural foraminal stenosis. L5-S1: Posterior disc bulge and moderate facet arthropathy. No spinal canal stenosis. Mild bilateral neural foraminal stenosis. Extra-vertebral soft tissues: Unremarkable. Visualized abdomen/pelvis: Renal cystic lesions. Aortic flow void is intact.     Impression: Mild degenerative changes of the lumbar spine most advanced at L4-5 where there is moderate right and mild left neural foraminal stenosis. Please see above for additional details and other levels. Electronically Signed: Vincenzo Hernandez  5/14/2023 3:24 PM EDT  Workstation ID: [id number]        Discharge Details        Discharge Medications      New Medications      Instructions Start Date   cyclobenzaprine 5 MG tablet  Commonly known as: FLEXERIL   5 mg, Oral, 3 Times Daily PRN      methylPREDNISolone 4 MG dose pack  Commonly known as: MEDROL   Take as directed on package instructions.      oxyCODONE-acetaminophen 5-325 MG per tablet  Commonly known as: PERCOCET   1 tablet, Oral, Every 6 Hours PRN         Continue These Medications      Instructions Start Date   budesonide-formoterol 80-4.5 MCG/ACT inhaler  Commonly known as: SYMBICORT   Inhale 2 puffs 2 (Two) Times a Day.      cetirizine 10 MG tablet  Commonly known as: zyrTEC   10 mg, Oral, Daily      gabapentin 300 MG capsule  Commonly known as: NEURONTIN   300 mg, Oral, 6 Times Daily      ketoconazole 2 % shampoo  Commonly known as: NIZORAL   1 application, Topical, Weekly      Magnesium Oxide -Mg Supplement 400 MG capsule   1 tablet, Oral, Daily      multivitamin with minerals tablet tablet   1 tablet, Oral, Daily      rizatriptan 10 MG tablet  Commonly known as: MAXALT   10 mg, Oral, Once As Needed      tamsulosin 0.4 MG capsule 24 hr capsule  Commonly known as: FLOMAX   Take 1 capsule by mouth Daily.      traMADol 50 MG tablet  Commonly known as: ULTRAM   50 mg, Oral, Every 12 Hours PRN      vitamin D3 125 MCG (5000 UT) tablet tablet    5,000 Units, Oral, Daily         Stop These Medications    metaxalone 800 MG tablet  Commonly known as: SKELAXIN            Allergies   Allergen Reactions   • Cefaclor Unknown (See Comments)     Had a reaction in the past. No reaction recently.   • Ciprofloxacin Other (See Comments)   • Levofloxacin Other (See Comments)   • Other Other (See Comments)     Pet Dander-allergies   • Sulfa Antibiotics GI Intolerance         Discharge Disposition:  Home or Self Care    Diet:  Hospital:No active diet order         CODE STATUS:    Code Status and Medical Interventions:   Ordered at: 05/14/23 1426     Code Status (Patient has no pulse and is not breathing):    CPR (Attempt to Resuscitate)     Medical Interventions (Patient has pulse or is breathing):    Full Support       Future Appointments   Date Time Provider Department Center   6/21/2023 11:30 AM Lashay Garcia APRN [facility]       Additional Instructions for the Follow-ups that You Need to Schedule     Discharge Follow-up with PCP   As directed       Currently Documented PCP:    Provider, No Known    PCP Phone Number:    None     Follow Up Details: 1 week                     Karel Shin DO  05/15/23      Time Spent on Discharge:  I spent  45  minutes on this discharge activity which included: face-to-face encounter with the patient, reviewing the data in the system, coordination of the care with the nursing staff as well as consultants, documentation, and entering orders.

## 2023-05-15 NOTE — PLAN OF CARE
"Goal Outcome Evaluation:  During shift change and bedside report patient complained of the pain scale and not having his needs met and his pain under control. He felt that in the ED it was explained to him that he could have the pain medicine anytime he wanted it and any one of the pain medications that he requested. He was advised that we do have a pain scale and what he rated would depend on the pain medication that he received. The patient continued to have request during the night. The patient and wife was concerned with patient's oxygen. He did drop to the low 80's while he was sleeping, I did advise the patient that I would be putting o2 on him to assist with his breathing and getting a good night. Sleep. The patient then continued to have requests and questions on his home cpap machine that he does not wear.  He was advised that he may get a better night sleep and less headaches if he were to use the cpap at night. The patient stated instead of me answering his question, by saying the cpap may help with his complaints, I should offer a solution, like fix his bed. Patients that laying flat gives him a headache and if his bed was fixed, it would fix the problem. The patient was then complaining on the bed. The head of the bed did not stay up. I advised the patient that I \"would order a new bed\". The patient was frustrated with this answer as he was not told a time frame that he would get this new bed. I did apologize for the miscommunication and the patient continued to have complaints throughout the night. The patient did speak with the charge nurse, he was not happy because he and his wife was not offered any food during the night, they had not requested any food at this time. When the patient requested food, they both received food. 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 The patient was offered several solutions during the night. At " the beginning of the shift the patient stated that he would rather go home than to stay here and have to ask for medication. I did advise him that I could have a doctor come and speak with him, but I was not for sure excatly what medication he would be sent home and he at that point opted to stay. The patient had some n/v and zofran was offered and given. The patient was having a headache and neck pain, ice pack and a muscle relaxer were offered and accepted. Rounds were made often to the room with repeated request all night.         Progress: no change

## 2023-05-16 ENCOUNTER — READMISSION MANAGEMENT (OUTPATIENT)
Dept: CALL CENTER | Facility: HOSPITAL | Age: 68
End: 2023-05-16
Payer: MEDICARE

## 2023-05-16 NOTE — OUTREACH NOTE
Prep Survey    Flowsheet Row Responses   [facility] facility patient discharged from? [facility]   Is LACE score < 7 ? No   Eligibility Readm Mgmt   Discharge diagnosis Intractable back pain    Does the patient have one of the following disease processes/diagnoses(primary or secondary)? Other   Does the patient have Home health ordered? No   Is there a DME ordered? No   Prep survey completed? Yes          Emerita LIVINGSTON - Registered Nurse

## 2023-05-18 ENCOUNTER — READMISSION MANAGEMENT (OUTPATIENT)
Dept: CALL CENTER | Facility: HOSPITAL | Age: 68
End: 2023-05-18
Payer: MEDICARE

## 2023-05-18 NOTE — OUTREACH NOTE
Medical Week 1 Survey    Flowsheet Row Responses   [facility] patient discharged from? [place]   Does the patient have one of the following disease processes/diagnoses(primary or secondary)? Other   Week 1 attempt successful? No   Unsuccessful attempts Attempt 1          Melinda Smith Registered Nurse

## 2023-05-23 ENCOUNTER — READMISSION MANAGEMENT (OUTPATIENT)
Dept: CALL CENTER | Facility: HOSPITAL | Age: 68
End: 2023-05-23
Payer: MEDICARE

## 2023-05-23 NOTE — OUTREACH NOTE
Medical Week 1 Survey    Flowsheet Row Responses   [facility] patient discharged from? [facility]   Does the patient have one of the following disease processes/diagnoses(primary or secondary)? Other   Week 1 attempt successful? Yes   Call start time 1330   Call end time 1335   Discharge diagnosis Intractable back pain    Meds reviewed with patient/caregiver? Yes   Is the patient having any side effects they believe may be caused by any medication additions or changes? No   Does the patient have all medications ordered at discharge? Yes   Is the patient taking all medications as directed (includes completed medication regime)? Yes   Does the patient have a primary care provider?  No   Has the patient kept scheduled appointments due by today? N/A   Has home health visited the patient within 72 hours of discharge? N/A   Psychosocial issues? No   Did the patient receive a copy of their discharge instructions? Yes   Nursing interventions Reviewed instructions with patient   What is the patient's perception of their health status since discharge? Improving   Is the patient/caregiver able to teach back signs and symptoms related to disease process for when to call PCP? Yes   Is the patient/caregiver able to teach back signs and symptoms related to disease process for when to call 911? Yes   Is the patient/caregiver able to teach back the hierarchy of who to call/visit for symptoms/problems? PCP, Specialist, Home health nurse, Urgent Care, ED, 911 Yes   If the patient is a current smoker, are they able to teach back resources for cessation? Not a smoker   Week 1 call completed? Yes   Graduated Yes   Is the patient interested in additional calls from an ambulatory ?  NOTE:  applies to high risk patients requiring additional follow-up. No   Did the patient feel the follow up calls were helpful during their recovery period? Yes   Was the number of calls appropriate? Yes   Wrap up additional comments Patient  improving and voices no need for further calls.          Tiarra DANIELS - Licensed Nurse

## 2023-06-02 DIAGNOSIS — G62.9 PERIPHERAL POLYNEUROPATHY: ICD-10-CM

## 2023-06-02 NOTE — TELEPHONE ENCOUNTER
Recent notes from PCP reviewed stating he is not taking gabapentin. Can you please check with patient to ensure he is in need of a refill-If he does need the refill, I will approve until his follow up on 6/21/2023. ThanksLashay

## 2023-06-07 ENCOUNTER — TELEPHONE (OUTPATIENT)
Dept: NEUROLOGY | Facility: CLINIC | Age: 68
End: 2023-06-07
Payer: MEDICARE

## 2023-06-07 RX ORDER — GABAPENTIN 300 MG/1
300 CAPSULE ORAL
Qty: 180 CAPSULE | Refills: 5 | OUTPATIENT
Start: 2023-06-07

## 2023-06-07 NOTE — TELEPHONE ENCOUNTER
Patient called in and stated that he done a swab test with Dr. Tarango and it came back that he has MHTFR and potentially have DVT. He fell about 3 years ago through a ceiling. He fell about 14 feet. He is not sure if he wants on the gabapentin. They are playing around with medications to see what helps him the best. He feels like he is getting worse. The pain is the whole backside of his body. He has been on tramodol and a muscle relaxer. He is sluggish until he takes the tramodol and after that then he can get his work done. At night time he pays for it because the pain is so bad. He stated that he isn't taking Gabapentin right now and he is going to wait and see what Lashay Garcia says at his appointment at the end of the month.

## 2023-06-07 NOTE — TELEPHONE ENCOUNTER
Requested Prescriptions     Signed Prescriptions Disp Refills    spironolactone (ALDACTONE) 25 mg tablet 30 Tablet 2     Sig: TAKE ONE TABLET BY MOUTH DAILY     Authorizing Provider: Tyson Sun     Ordering User: Zak Anderson verbal order Zulay spoke with pt and sent a separate message to Lashay in regards to pt medication

## 2023-06-21 PROBLEM — S72.409A FRACTURE, FEMUR, DISTAL: Status: ACTIVE | Noted: 2019-10-15

## 2023-06-21 PROBLEM — M51.36 DDD (DEGENERATIVE DISC DISEASE), LUMBAR: Status: ACTIVE | Noted: 2023-06-21

## 2023-06-21 PROBLEM — E34.9 TESTOSTERONE DEFICIENCY: Status: ACTIVE | Noted: 2023-06-21

## 2023-06-21 PROBLEM — F41.9 ANXIETY: Status: ACTIVE | Noted: 2023-01-23

## 2023-06-21 PROBLEM — Z98.84 STATUS POST BARIATRIC SURGERY: Status: ACTIVE | Noted: 2023-01-23

## 2023-06-21 PROBLEM — E03.8 SUBCLINICAL HYPOTHYROIDISM: Status: ACTIVE | Noted: 2021-04-07

## 2023-06-21 PROBLEM — R26.9 ABNORMAL GAIT: Status: ACTIVE | Noted: 2023-01-23

## 2023-06-21 PROBLEM — E55.9 VITAMIN D DEFICIENCY: Status: ACTIVE | Noted: 2021-11-10

## 2023-06-21 PROBLEM — G47.10 DAYTIME HYPERSOMNIA: Status: ACTIVE | Noted: 2023-01-23

## 2023-06-21 PROBLEM — D64.9 ANEMIA: Status: ACTIVE | Noted: 2021-11-10

## 2023-06-21 PROBLEM — H90.3 SENSORINEURAL HEARING LOSS (SNHL) OF BOTH EARS: Status: ACTIVE | Noted: 2023-05-16

## 2023-06-21 PROBLEM — M79.673 FOOT PAIN: Status: ACTIVE | Noted: 2019-12-06

## 2023-06-21 PROBLEM — R41.89 IMPAIRED COGNITION: Status: ACTIVE | Noted: 2023-01-23

## 2023-06-21 PROBLEM — H52.4 BILATERAL PRESBYOPIA: Status: ACTIVE | Noted: 2017-08-29

## 2023-06-21 PROBLEM — G43.909 MIGRAINE HEADACHE: Status: ACTIVE | Noted: 2020-12-08

## 2023-06-21 PROBLEM — R00.1 BRADYCARDIA: Status: ACTIVE | Noted: 2021-11-10

## 2023-06-21 PROBLEM — R53.83 FATIGUE: Status: ACTIVE | Noted: 2021-11-10

## 2023-06-21 PROBLEM — M79.2 NERVE PAIN: Status: ACTIVE | Noted: 2021-03-05

## 2023-06-21 PROBLEM — G57.10 MERALGIA PARESTHETICA: Status: ACTIVE | Noted: 2023-06-21

## 2023-06-21 PROBLEM — Z86.69 HISTORY OF OBSTRUCTIVE SLEEP APNEA: Status: ACTIVE | Noted: 2023-01-23

## 2023-06-21 PROBLEM — N40.0 BENIGN PROSTATIC HYPERPLASIA: Status: ACTIVE | Noted: 2019-12-27

## 2023-06-21 PROBLEM — C44.519 BASAL CELL CARCINOMA OF BACK: Status: ACTIVE | Noted: 2018-07-26

## 2023-06-21 PROBLEM — H25.13 AGE-RELATED NUCLEAR CATARACT OF BOTH EYES: Status: ACTIVE | Noted: 2022-05-24

## 2023-06-21 PROBLEM — Z85.828 HISTORY OF SKIN CANCER: Status: ACTIVE | Noted: 2023-01-23

## 2023-08-10 ENCOUNTER — OFFICE VISIT (OUTPATIENT)
Dept: PAIN MEDICINE | Facility: CLINIC | Age: 68
End: 2023-08-10
Payer: MEDICARE

## 2023-08-10 VITALS
RESPIRATION RATE: 18 BRPM | WEIGHT: 274.4 LBS | HEART RATE: 70 BPM | BODY MASS INDEX: 31.75 KG/M2 | DIASTOLIC BLOOD PRESSURE: 83 MMHG | TEMPERATURE: 96.4 F | HEIGHT: 78 IN | OXYGEN SATURATION: 97 % | SYSTOLIC BLOOD PRESSURE: 133 MMHG

## 2023-08-10 DIAGNOSIS — M79.10 MUSCLE TENSION PAIN: Primary | ICD-10-CM

## 2023-08-10 DIAGNOSIS — M54.6 THORACIC BACK PAIN, UNSPECIFIED BACK PAIN LATERALITY, UNSPECIFIED CHRONICITY: Primary | ICD-10-CM

## 2023-08-10 PROCEDURE — 1160F RVW MEDS BY RX/DR IN RCRD: CPT | Performed by: STUDENT IN AN ORGANIZED HEALTH CARE EDUCATION/TRAINING PROGRAM

## 2023-08-10 PROCEDURE — 1159F MED LIST DOCD IN RCRD: CPT | Performed by: STUDENT IN AN ORGANIZED HEALTH CARE EDUCATION/TRAINING PROGRAM

## 2023-08-10 PROCEDURE — 1125F AMNT PAIN NOTED PAIN PRSNT: CPT | Performed by: STUDENT IN AN ORGANIZED HEALTH CARE EDUCATION/TRAINING PROGRAM

## 2023-08-10 PROCEDURE — 99204 OFFICE O/P NEW MOD 45 MIN: CPT | Performed by: STUDENT IN AN ORGANIZED HEALTH CARE EDUCATION/TRAINING PROGRAM

## 2023-08-10 RX ORDER — ATOMOXETINE 40 MG/1
40 CAPSULE ORAL DAILY
COMMUNITY
Start: 2023-06-28

## 2023-08-10 NOTE — PROGRESS NOTES
Referring Physician: Lashay Garcia, APRN  [address]  [place] [address]    Primary Physician: Mentzer, Elizabeth Kathleen, PA    CHIEF COMPLAINT or REASON FOR VISIT: Back Pain (Pain reaches from back all the way down to calves )      Initial history of present illness on 08/10/2023:  Mr. Khang Abebe is 67 y.o. male who presents as a new patient referral for evaluation and treatment of chronic back pain.  Patient states that his primary pain complaint today is bilateral periscapular/upper thoracic pain.  He had this issue for several months without any specific inciting event or trauma.  He additionally complains of bilateral foot neuropathy but this is well-controlled with a mixture of tramadol and gabapentin.  His issue with these medications has been of fatigue, improving with de-escalating doses.  He has had good success with heat therapy in the past.  He denies any upper extremity radiation or lower extremity radiation.  He has had a lumbar MRI before.  Has not completed physical therapy.    Interval history:    Interventions:      Objective Pain Scoring:   BRIEF PAIN INVENTORY:  Total score:   Pain Score    08/10/23 1141   PainSc:   1   PainLoc: Back      PHQ-2: PHQ-2 Total Score: 1  PHQ-9: PHQ-9: Brief Depression Severity Measure Score: 1  Opioid Risk Tool:         Review of Systems:   ROS negative except as otherwise noted     Past Medical History:   Past Medical History:   Diagnosis Date    Actinic keratosis     Asthma     Bronchitis     CAP (community acquired pneumonia)     Cardiac abnormality     Fatigue     H/O chest x-ray 02/17/2016    Interstitial and granulomatous scarring is seen centrally and peripherallly in the chest. Superimposed active disease or consolidation is not identified    H/O chest x-ray 07/12/2014    No acute cardiopulmonary process    H/O echocardiogram 10/31/2014    Normal LVSF. Est EF 60-65%. Mild mitral valve prolpase. Trace MR    History of PFTs  03/24/2016    PFT acceptable and reproducible. Pt gave best effort. Normal PFT. Normal lung volumes and normal diffusion    Low testosterone     Meralgia paresthetica     Neuropathy     Sinusitis     Sleep apnea     Strep pharyngitis          Past Surgical History:   Past Surgical History:   Procedure Laterality Date    CHOLECYSTECTOMY      HERNIA REPAIR      Umbilical Hernia Repair    NOSE SURGERY      Nasal Septum Deviation Repair    OTHER SURGICAL HISTORY      Esophageal dilation         Family History   Family History   Problem Relation Age of Onset    Alcohol abuse Father     Liver cancer Father     Lung cancer Father     Heart disease Other         coronary arteriosclerosis    Other Other         PVD         Social History   Social History     Socioeconomic History    Marital status:    Tobacco Use    Smoking status: Never    Smokeless tobacco: Never   Vaping Use    Vaping Use: Never used   Substance and Sexual Activity    Alcohol use: No    Drug use: Never    Sexual activity: Defer        Medications:     Current Outpatient Medications:     atomoxetine (STRATTERA) 40 MG capsule, Take 1 capsule by mouth Daily., Disp: , Rfl:     budesonide-formoterol (SYMBICORT) 80-4.5 MCG/ACT inhaler, Inhale 2 puffs 2 (Two) Times a Day., Disp: , Rfl:     Cholecalciferol (vitamin D3) 125 MCG (5000 UT) tablet tablet, Take 1 tablet by mouth Daily., Disp: , Rfl:     cromolyn (NASALCHROM) 5.2 MG/ACT nasal spray, 1 spray into the nostril(s) as directed by provider 4 (Four) Times a Day., Disp: , Rfl:     cyclobenzaprine (FLEXERIL) 5 MG tablet, Take 1 tablet by mouth 3 (Three) Times a Day As Needed for Muscle Spasms., Disp: 30 tablet, Rfl: 0    fexofenadine (ALLEGRA) 180 MG tablet, , Disp: , Rfl:     flunisolide (NASALIDE) 25 MCG/ACT (0.025%) solution nasal spray, , Disp: , Rfl:     gabapentin (NEURONTIN) 300 MG capsule, Take 1-2 capsules by mouth Every Night., Disp: 180 capsule, Rfl: 1    ketoconazole (NIZORAL) 2 % shampoo,  "Apply 1 application topically to the appropriate area as directed 1 (One) Time Per Week., Disp: , Rfl:     Magnesium Oxide -Mg Supplement 400 MG capsule, Take 1 tablet by mouth Daily., Disp: , Rfl:     mupirocin (BACTROBAN) 2 % ointment, , Disp: , Rfl:     rizatriptan (MAXALT) 10 MG tablet, Take 1 tablet by mouth 1 (One) Time As Needed for Migraine., Disp: , Rfl:     tadalafil (CIALIS) 20 MG tablet, Take 1 tablet by mouth., Disp: , Rfl:     tamsulosin (FLOMAX) 0.4 MG capsule 24 hr capsule, Take 1 capsule by mouth Daily., Disp: , Rfl:     traMADol (ULTRAM) 50 MG tablet, Take 1 tablet by mouth Every 12 (Twelve) Hours As Needed., Disp: , Rfl:     ZTlido 1.8 %, , Disp: , Rfl:         Physical Exam:     Vitals:    08/10/23 1141   BP: 133/83   BP Location: Left arm   Patient Position: Sitting   Pulse: 70   Resp: 18   Temp: 96.4 øF (35.8 øC)   SpO2: 97%   Weight: 124 kg (274 lb 6.4 oz)   Height: 198.1 cm (77.99\")  Comment: pt reported   PainSc:   1   PainLoc: Back        General: Alert and oriented, No acute distress.   HEENT: Normocephalic, atraumatic.   Cardiovascular: No gross edema  Respiratory: Respirations are non-labored    Cervical Spine:   No masses or atrophy  Range of motion - Flexion normal. Extension normal. Lateral rotation normal.   Palpation - nontender   Spurling's - negative     Thoracic Spine:   Inspection: no gross abnormality  Paraspinal muscle palpation: Tender to palpation bilateral parascapular musculature  Spinous process palpation: nontender    Lumbar Spine:   No masses or atrophy  Range of motion - Flexion normal. Extension normal. Right Lat Bending normal. Left Lat Bending normal  Facet Loading: Negative bilaterally  Facet Palpation - Nontender   PSIS tenderness - Negative bilaterally  Mt's/CHUCK/Thigh thrust - Negative bilaterally  Straight leg raise: Negative bilaterally  Slump test: Negative bilaterally    Motor Exam:    Strength: Rate on 1-5 scale Right Left    C5-Elbow flexion, Deltoid " 5 5    C6-Wrist extension 5 5    C7- Elbow / finger extension 5 5    C8- Finger flexion 5 5    T1- Intrinsics hand 5 5    Strength: Rate on 1-5 scale Right Left    L1/2- hip flexion 5 5    L3- knee extension 5 5    L4- ankle dorsiflexion 5 5    L5- great toe extension 5 5    S1- ankle plantarflexion 5 5    Psychiatric: Cooperative.   Gait: Normal   Assistive Devices: None    Imaging Studies:   No results found for this or any previous visit.      Impression & Plan:   08/10/2023: Khang Abebe is a 67 y.o. male with past medical history significant for prostate cancer, GERD, eczema, IBS, interstitial lung disease, peripheral neuropathy (MTHFR deficiency), anxiety, who presents to the pain clinic for evaluation and treatment of chronic upper thoracic back pain.  I personally reviewed and interpreted his lumbar MRI dated 8/14/2023: DDD at L5/S1; moderate right L4/5 NFS; mild to moderate left L5/S1 NFS; facet spondylosis at L5/S1.  Clinical examination consistent with thoracic muscle pain.  Will give him referral for physical therapy.  Additionally discussed strategies such as heat/cold therapy, TENS unit, magnesium, Epsom salts, dry needling, chiropractic.  I do not see a role for interventional therapy.    1. Muscle tension pain        PLAN:  1. Medication Recommendations: Recommend Voltaren topical, NSAIDs, Tylenol.  Can trial turmeric 500 mg twice daily if NSAID contraindicated.  Recommend magnesium, heat therapy.  Agree with Flexeril    2. Physical Therapy: Referral given today    3. Psychological: defer    4. Complementary and alternative (CAM) Therapies:     5. Labs: None indicated     6. Imaging: MRI independently interpreted and reviewed with patient    7. Interventions: None indicated     8. Referrals: Physical therapy    9. Records requested: n/a    10. Lifestyle goals:    Follow-up as needed      [facility]  Skyler Harrell MD

## 2023-08-29 ENCOUNTER — OFFICE VISIT (OUTPATIENT)
Dept: FAMILY MEDICINE CLINIC | Facility: CLINIC | Age: 68
End: 2023-08-29
Payer: MEDICARE

## 2023-08-29 ENCOUNTER — TELEPHONE (OUTPATIENT)
Dept: FAMILY MEDICINE CLINIC | Facility: CLINIC | Age: 68
End: 2023-08-29

## 2023-08-29 VITALS
SYSTOLIC BLOOD PRESSURE: 128 MMHG | WEIGHT: 279.4 LBS | BODY MASS INDEX: 32.33 KG/M2 | DIASTOLIC BLOOD PRESSURE: 62 MMHG | TEMPERATURE: 98.7 F | HEART RATE: 90 BPM | HEIGHT: 78 IN | OXYGEN SATURATION: 98 %

## 2023-08-29 DIAGNOSIS — M79.671 CHRONIC PAIN IN RIGHT FOOT: ICD-10-CM

## 2023-08-29 DIAGNOSIS — Z76.89 ENCOUNTER TO ESTABLISH CARE WITH NEW DOCTOR: ICD-10-CM

## 2023-08-29 DIAGNOSIS — M62.838 MUSCLE SPASM: ICD-10-CM

## 2023-08-29 DIAGNOSIS — G62.9 NEUROPATHY: ICD-10-CM

## 2023-08-29 DIAGNOSIS — Z51.81 THERAPEUTIC DRUG MONITORING: ICD-10-CM

## 2023-08-29 DIAGNOSIS — J45.909 ASTHMA, UNSPECIFIED ASTHMA SEVERITY, UNSPECIFIED WHETHER COMPLICATED, UNSPECIFIED WHETHER PERSISTENT: ICD-10-CM

## 2023-08-29 DIAGNOSIS — M79.605 LEFT LEG PAIN: ICD-10-CM

## 2023-08-29 DIAGNOSIS — E55.9 VITAMIN D DEFICIENCY: ICD-10-CM

## 2023-08-29 DIAGNOSIS — G89.29 CHRONIC PAIN IN RIGHT FOOT: ICD-10-CM

## 2023-08-29 DIAGNOSIS — Z91.09 ENVIRONMENTAL ALLERGIES: Primary | ICD-10-CM

## 2023-08-29 RX ORDER — BUDESONIDE AND FORMOTEROL FUMARATE DIHYDRATE 80; 4.5 UG/1; UG/1
2 AEROSOL RESPIRATORY (INHALATION)
Qty: 6.9 G | Refills: 2 | Status: SHIPPED | OUTPATIENT
Start: 2023-08-29

## 2023-08-29 RX ORDER — TRAMADOL HYDROCHLORIDE 50 MG/1
50 TABLET ORAL EVERY 12 HOURS PRN
Status: CANCELLED | OUTPATIENT
Start: 2023-08-29

## 2023-08-29 RX ORDER — CYCLOBENZAPRINE HCL 5 MG
5 TABLET ORAL NIGHTLY PRN
Qty: 30 TABLET | Refills: 2 | Status: SHIPPED | OUTPATIENT
Start: 2023-08-29

## 2023-08-29 RX ORDER — FEXOFENADINE HCL 180 MG/1
180 TABLET ORAL DAILY
Qty: 30 TABLET | Refills: 2 | Status: SHIPPED | OUTPATIENT
Start: 2023-08-29

## 2023-08-29 NOTE — PATIENT INSTRUCTIONS
Take medications as ordered.  Low fat, low salt diet.  Exercise as tolerated.  Continue to follow with specialists.  Call office with number of remaining Tramadol tablets.
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
## 2023-08-30 ENCOUNTER — HOSPITAL ENCOUNTER (EMERGENCY)
Facility: HOSPITAL | Age: 68
Discharge: HOME | End: 2023-08-30
Attending: EMERGENCY MEDICINE
Payer: MEDICARE

## 2023-08-30 ENCOUNTER — APPOINTMENT (OUTPATIENT)
Dept: GENERAL RADIOLOGY | Facility: HOSPITAL | Age: 68
End: 2023-08-30
Payer: MEDICARE

## 2023-08-30 ENCOUNTER — APPOINTMENT (OUTPATIENT)
Dept: CT IMAGING | Facility: HOSPITAL | Age: 68
End: 2023-08-30
Payer: MEDICARE

## 2023-08-30 VITALS
WEIGHT: 279 LBS | DIASTOLIC BLOOD PRESSURE: 79 MMHG | HEIGHT: 78 IN | TEMPERATURE: 98.9 F | SYSTOLIC BLOOD PRESSURE: 131 MMHG | RESPIRATION RATE: 16 BRPM | OXYGEN SATURATION: 94 % | HEART RATE: 59 BPM | BODY MASS INDEX: 32.28 KG/M2

## 2023-08-30 DIAGNOSIS — S80.212A KNEE ABRASION, LEFT, INITIAL ENCOUNTER: ICD-10-CM

## 2023-08-30 DIAGNOSIS — S06.0X0A CONCUSSION WITHOUT LOSS OF CONSCIOUSNESS, INITIAL ENCOUNTER: Primary | ICD-10-CM

## 2023-08-30 LAB
ALBUMIN SERPL-MCNC: 3.8 G/DL (ref 3.5–5.2)
ALBUMIN/GLOB SERPL: 1.5 G/DL
ALP SERPL-CCNC: 62 U/L (ref 39–117)
ALT SERPL W P-5'-P-CCNC: 12 U/L (ref 1–41)
ANION GAP SERPL CALCULATED.3IONS-SCNC: 8 MMOL/L (ref 5–15)
AST SERPL-CCNC: 16 U/L (ref 1–40)
B PARAPERT DNA SPEC QL NAA+PROBE: NOT DETECTED
B PERT DNA SPEC QL NAA+PROBE: NOT DETECTED
BASOPHILS # BLD AUTO: 0.05 10*3/MM3 (ref 0–0.2)
BASOPHILS NFR BLD AUTO: 0.7 % (ref 0–1.5)
BILIRUB SERPL-MCNC: 0.5 MG/DL (ref 0–1.2)
BILIRUB UR QL STRIP: NEGATIVE
BUN SERPL-MCNC: 13 MG/DL (ref 8–23)
BUN/CREAT SERPL: 14.3 (ref 7–25)
C PNEUM DNA NPH QL NAA+NON-PROBE: NOT DETECTED
CALCIUM SPEC-SCNC: 9.7 MG/DL (ref 8.6–10.5)
CHLORIDE SERPL-SCNC: 108 MMOL/L (ref 98–107)
CLARITY UR: CLEAR
CO2 SERPL-SCNC: 24 MMOL/L (ref 22–29)
COLOR UR: YELLOW
CREAT SERPL-MCNC: 0.91 MG/DL (ref 0.76–1.27)
DEPRECATED RDW RBC AUTO: 42.1 FL (ref 37–54)
EGFRCR SERPLBLD CKD-EPI 2021: 93.5 ML/MIN/1.73
EOSINOPHIL # BLD AUTO: 0.36 10*3/MM3 (ref 0–0.4)
EOSINOPHIL NFR BLD AUTO: 5.2 % (ref 0.3–6.2)
ERYTHROCYTE [DISTWIDTH] IN BLOOD BY AUTOMATED COUNT: 13 % (ref 12.3–15.4)
FLUAV SUBTYP SPEC NAA+PROBE: NOT DETECTED
FLUBV RNA ISLT QL NAA+PROBE: NOT DETECTED
GLOBULIN UR ELPH-MCNC: 2.6 GM/DL
GLUCOSE SERPL-MCNC: 148 MG/DL (ref 65–99)
GLUCOSE UR STRIP-MCNC: NEGATIVE MG/DL
HADV DNA SPEC NAA+PROBE: NOT DETECTED
HCOV 229E RNA SPEC QL NAA+PROBE: NOT DETECTED
HCOV HKU1 RNA SPEC QL NAA+PROBE: NOT DETECTED
HCOV NL63 RNA SPEC QL NAA+PROBE: NOT DETECTED
HCOV OC43 RNA SPEC QL NAA+PROBE: NOT DETECTED
HCT VFR BLD AUTO: 41.9 % (ref 37.5–51)
HGB BLD-MCNC: 13.7 G/DL (ref 13–17.7)
HGB UR QL STRIP.AUTO: NEGATIVE
HMPV RNA NPH QL NAA+NON-PROBE: NOT DETECTED
HPIV1 RNA ISLT QL NAA+PROBE: NOT DETECTED
HPIV2 RNA SPEC QL NAA+PROBE: NOT DETECTED
HPIV3 RNA NPH QL NAA+PROBE: NOT DETECTED
HPIV4 P GENE NPH QL NAA+PROBE: NOT DETECTED
IMM GRANULOCYTES # BLD AUTO: 0.04 10*3/MM3 (ref 0–0.05)
IMM GRANULOCYTES NFR BLD AUTO: 0.6 % (ref 0–0.5)
KETONES UR QL STRIP: NEGATIVE
LEUKOCYTE ESTERASE UR QL STRIP.AUTO: NEGATIVE
LYMPHOCYTES # BLD AUTO: 0.75 10*3/MM3 (ref 0.7–3.1)
LYMPHOCYTES NFR BLD AUTO: 10.8 % (ref 19.6–45.3)
M PNEUMO IGG SER IA-ACNC: NOT DETECTED
MCH RBC QN AUTO: 28.8 PG (ref 26.6–33)
MCHC RBC AUTO-ENTMCNC: 32.7 G/DL (ref 31.5–35.7)
MCV RBC AUTO: 88.2 FL (ref 79–97)
MONOCYTES # BLD AUTO: 0.52 10*3/MM3 (ref 0.1–0.9)
MONOCYTES NFR BLD AUTO: 7.5 % (ref 5–12)
NEUTROPHILS NFR BLD AUTO: 5.23 10*3/MM3 (ref 1.7–7)
NEUTROPHILS NFR BLD AUTO: 75.2 % (ref 42.7–76)
NITRITE UR QL STRIP: NEGATIVE
NRBC BLD AUTO-RTO: 0 /100 WBC (ref 0–0.2)
PH UR STRIP.AUTO: <=5 [PH] (ref 5–8)
PLATELET # BLD AUTO: 200 10*3/MM3 (ref 140–450)
PMV BLD AUTO: 10 FL (ref 6–12)
POTASSIUM SERPL-SCNC: 3.9 MMOL/L (ref 3.5–5.2)
PROT SERPL-MCNC: 6.4 G/DL (ref 6–8.5)
PROT UR QL STRIP: NEGATIVE
RBC # BLD AUTO: 4.75 10*6/MM3 (ref 4.14–5.8)
RHINOVIRUS RNA SPEC NAA+PROBE: NOT DETECTED
RSV RNA NPH QL NAA+NON-PROBE: NOT DETECTED
SARS-COV-2 RNA NPH QL NAA+NON-PROBE: NOT DETECTED
SODIUM SERPL-SCNC: 140 MMOL/L (ref 136–145)
SP GR UR STRIP: 1.02 (ref 1–1.03)
UROBILINOGEN UR QL STRIP: NORMAL
WBC NRBC COR # BLD: 6.95 10*3/MM3 (ref 3.4–10.8)

## 2023-08-30 PROCEDURE — 80053 COMPREHEN METABOLIC PANEL: CPT | Performed by: EMERGENCY MEDICINE

## 2023-08-30 PROCEDURE — 0202U NFCT DS 22 TRGT SARS-COV-2: CPT | Performed by: EMERGENCY MEDICINE

## 2023-08-30 PROCEDURE — 81003 URINALYSIS AUTO W/O SCOPE: CPT | Performed by: EMERGENCY MEDICINE

## 2023-08-30 PROCEDURE — 85025 COMPLETE CBC W/AUTO DIFF WBC: CPT | Performed by: EMERGENCY MEDICINE

## 2023-08-30 PROCEDURE — 36415 COLL VENOUS BLD VENIPUNCTURE: CPT

## 2023-08-30 PROCEDURE — 71045 X-RAY EXAM CHEST 1 VIEW: CPT

## 2023-08-30 PROCEDURE — 73560 X-RAY EXAM OF KNEE 1 OR 2: CPT

## 2023-08-30 PROCEDURE — 70450 CT HEAD/BRAIN W/O DYE: CPT

## 2023-08-30 PROCEDURE — 99284 EMERGENCY DEPT VISIT MOD MDM: CPT

## 2023-08-30 NOTE — Clinical Note
[facility] EMERGENCY DEPARTMENT  [address]  [facility] [address]  Phone: [phone]    Khang Abebe was seen and treated in our emergency department on 8/30/2023.  He may return to work on 09/04/2023.         Thank you for choosing [facility].    Jeffrey Barnes MD

## 2023-08-30 NOTE — ED PROVIDER NOTES
Subjective   History of Present Illness  67-year-old male who presents for evaluation of confusion, fever, left knee pain after recent fall.  The patient reports that he slipped in the shower yesterday and suffered a fall.  Prior to that he did not have any confusion or any symptoms.  He reports feeling mildly confused since that given time.  He does complain of some mild knee pain but has been able to stand and ambulate.  He did not truly hit his head but does state that his head was laney whenever he had that fall yesterday.  He does not take any anticoagulation.  He reports recorded a fever today in association with subtle confusion.  He asked provides a very detailed story upon my evaluation.  No complaints of chest pain, cough, shortness of breath.  No abdominal pain or change in bowel or urinary function.  No neck or midline back pain.  No pain to the bilateral upper or lower extremities.  No other acute complaints.    Review of Systems   Constitutional:  Positive for fever. Negative for chills and fatigue.   HENT:  Negative for congestion, ear pain, postnasal drip, sinus pressure and sore throat.    Eyes:  Negative for pain, redness and visual disturbance.   Respiratory:  Negative for cough, chest tightness and shortness of breath.    Cardiovascular:  Negative for chest pain, palpitations and leg swelling.   Gastrointestinal:  Negative for abdominal pain, anal bleeding, blood in stool, diarrhea, nausea and vomiting.   Endocrine: Negative for polydipsia and polyuria.   Genitourinary:  Negative for difficulty urinating, dysuria, frequency and urgency.   Musculoskeletal:  Positive for arthralgias. Negative for back pain and neck pain.   Skin:  Negative for pallor and rash.   Allergic/Immunologic: Negative for environmental allergies and immunocompromised state.   Neurological:  Positive for headaches. Negative for dizziness and weakness.   Hematological:  Negative for adenopathy.   Psychiatric/Behavioral:   Positive for confusion. Negative for self-injury and suicidal ideas. The patient is not nervous/anxious.    All other systems reviewed and are negative.    Past Medical History:   Diagnosis Date    Actinic keratosis     Asthma     Bronchitis     CAP (community acquired pneumonia)     Cardiac abnormality     Fatigue     H/O chest x-ray 02/17/2016    Interstitial and granulomatous scarring is seen centrally and peripherallly in the chest. Superimposed active disease or consolidation is not identified    H/O chest x-ray 07/12/2014    No acute cardiopulmonary process    H/O echocardiogram 10/31/2014    Normal LVSF. Est EF 60-65%. Mild mitral valve prolpase. Trace MR    History of PFTs 03/24/2016    PFT acceptable and reproducible. Pt gave best effort. Normal PFT. Normal lung volumes and normal diffusion    Low testosterone     Meralgia paresthetica     Neuropathy     Prostate cancer     History    Sinusitis     Sleep apnea     Strep pharyngitis        Allergies   Allergen Reactions    Cefaclor Unknown (See Comments)     Had a reaction in the past. No reaction recently.    Ciprofloxacin Other (See Comments)    Levofloxacin Other (See Comments)    Other Other (See Comments)     Pet Dander-allergies    Sulfa Antibiotics GI Intolerance       Past Surgical History:   Procedure Laterality Date    CHOLECYSTECTOMY      HERNIA REPAIR      Umbilical Hernia Repair    NOSE SURGERY      Nasal Septum Deviation Repair    OTHER SURGICAL HISTORY      Esophageal dilation       Family History   Problem Relation Age of Onset    Alcohol abuse Father     Liver cancer Father     Lung cancer Father     Heart disease Other         coronary arteriosclerosis    Other Other         PVD       Social History     Socioeconomic History    Marital status:    Tobacco Use    Smoking status: Never    Smokeless tobacco: Never   Vaping Use    Vaping Use: Never used   Substance and Sexual Activity    Alcohol use: No    Drug use: Never    Sexual  activity: Defer           Objective   Physical Exam  Vitals and nursing note reviewed.   Constitutional:       General: He is not in acute distress.     Appearance: Normal appearance. He is well-developed. He is not toxic-appearing or diaphoretic.   HENT:      Head: Normocephalic and atraumatic.      Right Ear: External ear normal.      Left Ear: External ear normal.      Nose: Nose normal.   Eyes:      General: Lids are normal.      Pupils: Pupils are equal, round, and reactive to light.   Neck:      Trachea: No tracheal deviation.   Cardiovascular:      Rate and Rhythm: Normal rate and regular rhythm.      Pulses: No decreased pulses.      Heart sounds: Normal heart sounds. No murmur heard.    No friction rub. No gallop.   Pulmonary:      Effort: Pulmonary effort is normal. No respiratory distress.      Breath sounds: Normal breath sounds. No decreased breath sounds, wheezing, rhonchi or rales.   Abdominal:      General: Bowel sounds are normal.      Palpations: Abdomen is soft.      Tenderness: There is no abdominal tenderness. There is no guarding or rebound.   Musculoskeletal:         General: No deformity. Normal range of motion.      Cervical back: Normal range of motion and neck supple.        Legs:    Lymphadenopathy:      Cervical: No cervical adenopathy.   Skin:     General: Skin is warm and dry.      Findings: No rash.   Neurological:      Mental Status: He is alert and oriented to person, place, and time.      GCS: GCS eye subscore is 4. GCS verbal subscore is 5. GCS motor subscore is 6.      Cranial Nerves: No cranial nerve deficit.      Sensory: No sensory deficit.      Comments: Normal intact mentation, GCS 15, NIH stroke scale 0.   Psychiatric:         Speech: Speech normal.         Behavior: Behavior normal.         Thought Content: Thought content normal.         Judgment: Judgment normal.       Procedures           ED Course                                           Medical Decision  Making  Differential diagnosis includes concussion, intracranial hemorrhage, left knee fracture, acute viral illness, urinary tract infection, pneumonia, other unspecified etiology.    Chest x-ray shows no acute disease.  CT scan of the head without contrast shows no acute abnormalities.  X-ray of the left knee shows no acute fracture or injury and the patient was observed to ambulate on the left knee.    From an infectious standpoint a viral respiratory panel is negative, urine is clean.    Lab evaluation shows normal white count, normal H&H, normal kidney function, no significant electrolyte abnormalities.    The patient's vital signs including oxygen saturation and blood pressure have remained normal throughout the ER course.    The patient will be discharged with the advised to rest, drink plenty of fluid, and take Tylenol or ibuprofen as needed for pain.    Problems Addressed:  Concussion without loss of consciousness, initial encounter: complicated acute illness or injury  Knee abrasion, left, initial encounter: complicated acute illness or injury    Amount and/or Complexity of Data Reviewed  Independent Historian: spouse     Details: Wife provides additional history  External Data Reviewed: labs, radiology, ECG and notes.  Labs: ordered. Decision-making details documented in ED Course.  Radiology: ordered and independent interpretation performed. Decision-making details documented in ED Course.        Final diagnoses:   Concussion without loss of consciousness, initial encounter   Knee abrasion, left, initial encounter       ED Disposition  ED Disposition       ED Disposition   Discharge    Condition   Stable    Comment   --               García Mulligan,   [address] [place], [place] [id number] [phone]    In 1 week           Medication List      No changes were made to your prescriptions during this visit.            Jeffrey Barnes MD  08/31/23 [id number]

## 2023-08-31 NOTE — DISCHARGE INSTRUCTIONS
Keep wound clean with soap and water.    Monitor symptoms and follow-up with primary care physician for recheck in 1 week.

## 2023-09-05 ENCOUNTER — TELEPHONE (OUTPATIENT)
Dept: FAMILY MEDICINE CLINIC | Facility: CLINIC | Age: 68
End: 2023-09-05
Payer: MEDICARE

## 2023-09-05 ENCOUNTER — TELEPHONE (OUTPATIENT)
Dept: NEUROLOGY | Facility: CLINIC | Age: 68
End: 2023-09-05
Payer: MEDICARE

## 2023-09-05 DIAGNOSIS — R41.3 MEMORY PROBLEM: Primary | ICD-10-CM

## 2023-09-05 DIAGNOSIS — Z91.81 HISTORY OF RECENT FALL: ICD-10-CM

## 2023-09-05 NOTE — TELEPHONE ENCOUNTER
Caller: Jennifer Abebe    Relationship to patient: Emergency Contact    Best call back number: [phone]    New or established patient?  [] New  [x] Established    Date of discharge: 8-30-23    Facility discharged from: [facility]    Diagnosis/Symptoms: CONCUSSION    Length of stay (If applicable): 2 HOURS    Specialty Only: Did you see a [employer] health provider?    [] Yes  [x] No  If so, who?     PT FELL AND WENT TO THE ER.   STATED THAT THE HOSPITAL SAID HE HAS A CONCUSSION BUT THEY CAN'T SEE IT ON THE MRI.  JENNIFER IS ALSO CALLING HIS PCP.  STATED PATIENT HAS HAD MEMORY LOSS FOR A WEEK NOW.  OK TO SCHEDULE D/T NEW DIAGNOSIS?    PLEASE ADVISE

## 2023-09-05 NOTE — TELEPHONE ENCOUNTER
Patient needs a new order/referral to go back to [facility] for a fall that he had a couple weeks ago now, he ended up going to the er afterwards because he has developed some memory issues, chills, generally not feeling well, he did get an mri at the er and it was inconclusive. His wife would like to go ahead and get him back in with his neurology office at [facility], they just need a new referral from us. Please advise.

## 2023-09-06 LAB — DRUGS UR: NORMAL

## 2023-09-06 NOTE — TELEPHONE ENCOUNTER
I reviewed his ER notes from 8/30/2023. He had a CT scan of his brain. The CT of the head shows no bleeding within the brain but a CT scan of the head can be normal with a concussion. I saw the patient on 6/21/2023-we saw him for neuropathy. We can add memory questions and workup to his follow up in January. Often when patient's suffer a concussion, they can have symptoms for 8-12 weeks and sometimes a bit longer. We can add him to our cancellation list, but currently we have no openings. Thanks, SEAN Savage

## 2023-09-07 NOTE — TELEPHONE ENCOUNTER
Left detailed vm for Khang relaying [facility]'s response.  Office # given if any other questions    As well will go ahead and add memory work up to his follow up in January and add him to the cancellation list. Thanks!

## 2023-09-22 ENCOUNTER — HOSPITAL ENCOUNTER (OUTPATIENT)
Dept: NEUROLOGY | Facility: HOSPITAL | Age: 68
Discharge: HOME | End: 2023-09-22
Payer: MEDICARE

## 2023-09-22 ENCOUNTER — TELEPHONE (OUTPATIENT)
Dept: NEUROLOGY | Facility: CLINIC | Age: 68
End: 2023-09-22
Payer: MEDICARE

## 2023-09-22 DIAGNOSIS — G62.9 PERIPHERAL POLYNEUROPATHY: ICD-10-CM

## 2023-09-22 DIAGNOSIS — M51.36 DDD (DEGENERATIVE DISC DISEASE), LUMBAR: ICD-10-CM

## 2023-09-22 PROCEDURE — 95886 MUSC TEST DONE W/N TEST COMP: CPT

## 2023-09-22 PROCEDURE — 95910 NRV CNDJ TEST 7-8 STUDIES: CPT

## 2023-09-22 NOTE — PROGRESS NOTES
Please notify [facility] that his nerve and muscle study confirms moderate neuropathy of both lower extremities. No evidence of radiating symptoms from his low back at this time. I will forward results to PCP and pain management. We will follow up as scheduled in clinic. Thanks, SEAN Savage

## 2023-09-22 NOTE — TELEPHONE ENCOUNTER
----- Message from SEAN Amador sent at 9/22/2023  1:12 PM EDT -----  Please notify [facility] that his nerve and muscle study confirms moderate neuropathy of both lower extremities. No evidence of radiating symptoms from his low back at this time. I will forward results to PCP and pain management. We will follow up as scheduled in clinic. Thanks, SEAN Savage

## 2024-01-30 DIAGNOSIS — J45.909 ASTHMA, UNSPECIFIED ASTHMA SEVERITY, UNSPECIFIED WHETHER COMPLICATED, UNSPECIFIED WHETHER PERSISTENT: ICD-10-CM

## 2024-01-30 NOTE — TELEPHONE ENCOUNTER
Rx Refill Note  Requested Prescriptions     Pending Prescriptions Disp Refills    Symbicort 80-4.5 MCG/ACT inhaler [Pharmacy Med Name: SYMBICORT 80-4.5 MCG INHALER] 10.2 g      Sig: INHALE 2 PUFFS BY MOUTH TWICE A DAY      Last office visit with prescribing clinician: 8/29/2023   Last telemedicine visit with prescribing clinician: Visit date not found   Next office visit with prescribing clinician: Visit date not found                         Would you like a call back once the refill request has been completed: [] Yes [] No    If the office needs to give you a call back, can they leave a voicemail: [] Yes [] No    Jaison Mata MA  01/30/24, 09:07 EST

## 2024-01-31 RX ORDER — DILTIAZEM HYDROCHLORIDE 60 MG/1
2 TABLET, FILM COATED ORAL 2 TIMES DAILY
Qty: 10.2 G | Refills: 1 | Status: SHIPPED | OUTPATIENT
Start: 2024-01-31

## 2024-06-22 DIAGNOSIS — E55.9 VITAMIN D DEFICIENCY: ICD-10-CM

## 2024-06-24 RX ORDER — CHOLECALCIFEROL TAB 125 MCG (5000 UNIT) 125 MCG (5000 UT)
1 TAB DAILY
Qty: 30 TABLET | Refills: 5 | Status: SHIPPED | OUTPATIENT
Start: 2024-06-24